# Patient Record
Sex: FEMALE | Race: WHITE | NOT HISPANIC OR LATINO | URBAN - METROPOLITAN AREA
[De-identification: names, ages, dates, MRNs, and addresses within clinical notes are randomized per-mention and may not be internally consistent; named-entity substitution may affect disease eponyms.]

---

## 2020-11-18 ENCOUNTER — TRANSFERRED RECORDS (OUTPATIENT)
Dept: HEALTH INFORMATION MANAGEMENT | Facility: CLINIC | Age: 60
End: 2020-11-18

## 2020-11-30 ENCOUNTER — TRANSFERRED RECORDS (OUTPATIENT)
Dept: HEALTH INFORMATION MANAGEMENT | Facility: CLINIC | Age: 60
End: 2020-11-30

## 2021-02-08 ENCOUNTER — TRANSFERRED RECORDS (OUTPATIENT)
Dept: HEALTH INFORMATION MANAGEMENT | Facility: CLINIC | Age: 61
End: 2021-02-08

## 2021-02-12 ENCOUNTER — TELEPHONE (OUTPATIENT)
Dept: GASTROENTEROLOGY | Facility: CLINIC | Age: 61
End: 2021-02-12

## 2021-03-04 ENCOUNTER — TRANSFERRED RECORDS (OUTPATIENT)
Dept: HEALTH INFORMATION MANAGEMENT | Facility: CLINIC | Age: 61
End: 2021-03-04

## 2021-03-05 ENCOUNTER — TRANSFERRED RECORDS (OUTPATIENT)
Dept: HEALTH INFORMATION MANAGEMENT | Facility: CLINIC | Age: 61
End: 2021-03-05

## 2021-03-05 ENCOUNTER — TELEPHONE (OUTPATIENT)
Dept: GASTROENTEROLOGY | Facility: CLINIC | Age: 61
End: 2021-03-05

## 2021-03-05 NOTE — TELEPHONE ENCOUNTER
[facility] Call Center    Phone Message    May a detailed message be left on voicemail: yes     Reason for Call: Other: Pt wishes to be seen in-person. Pt cannot do virtual call for pt lives in [place]. Please contact pt back to schedule. Thanks!     Action Taken: Message routed to:  [facility] hep    Travel Screening: Not Applicable

## 2021-03-08 ENCOUNTER — TELEPHONE (OUTPATIENT)
Dept: GASTROENTEROLOGY | Facility: CLINIC | Age: 61
End: 2021-03-08

## 2021-03-08 NOTE — TELEPHONE ENCOUNTER
RECORDS RECEIVED FROM: External   Appt Date: 03.09.2021   NOTES STATUS DETAILS   OFFICE NOTE from referring provider Received 02.12.2021 Stephanie Childs MD [facility]   OFFICE NOTES from other specialists N/A    DISCHARGE SUMMARY from hospital N/A    MEDICATION LIST Received 02.12.2021 Received [facility]   LIVER BIOSPY (IF APPLICABLE)      PATHOLOGY REPORTS  N/A    IMAGING     ENDOSCOPY (IF AVAILABLE) N/A    COLONOSCOPY (IF AVAILABLE) N/A    ULTRASOUND LIVER N/A    CT OF ABDOMEN Received 02.21.2021 CT ABDOMEN PELVIS [facility]   MRI OF LIVER N/A    FIBROSCAN, US ELASTOGRAPHY, FIBROSIS SCAN, MR ELASTOGRAPHY N/A    LABS     HEPATIC PANEL (LIVER PANEL) N/A    BASIC METABOLIC PANEL N/A    COMPLETE METABOLIC PANEL N/A    COMPLETE BLOOD COUNT (CBC) N/A    INTERNATIONAL NORMALIZED RATIO (INR) N/A    HEPATITIS C ANTIBODY N/A    HEPATITIS C VIRAL LOAD/PCR N/A    HEPATITIS C GENOTYPE N/A    HEPATITIS B SURFACE ANTIGEN Received 02.12.2021   HEPATITIS B SURFACE ANTIBODY Received 02.12.2021   HEPATITIS B DNA QUANT LEVEL N/A    HEPATITIS B CORE ANTIBODY Received 02.12.2021

## 2021-03-08 NOTE — TELEPHONE ENCOUNTER
[facility] Call Center    Phone Message    May a detailed message be left on voicemail: yes     Reason for Call: Patient has an in-person visit with Dr. Aiyana Pedersen on 3/9/21. Patient would like to change that appointment to video only.  Please reach out to patient.    Action Taken: Message routed to:  [facility] Hepatology    Travel Screening: Not Applicable

## 2021-03-09 ENCOUNTER — PRE VISIT (OUTPATIENT)
Dept: GASTROENTEROLOGY | Facility: CLINIC | Age: 61
End: 2021-03-09

## 2021-03-09 ENCOUNTER — VIRTUAL VISIT (OUTPATIENT)
Dept: GASTROENTEROLOGY | Facility: CLINIC | Age: 61
End: 2021-03-09
Attending: STUDENT IN AN ORGANIZED HEALTH CARE EDUCATION/TRAINING PROGRAM
Payer: COMMERCIAL

## 2021-03-09 DIAGNOSIS — F10.21 ALCOHOL USE DISORDER, SEVERE, IN EARLY REMISSION (H): ICD-10-CM

## 2021-03-09 DIAGNOSIS — R17 ELEVATED BILIRUBIN: ICD-10-CM

## 2021-03-09 DIAGNOSIS — K70.31 ALCOHOLIC CIRRHOSIS OF LIVER WITH ASCITES (H): Primary | ICD-10-CM

## 2021-03-09 PROCEDURE — 99205 OFFICE O/P NEW HI 60 MIN: CPT | Mod: 95 | Performed by: STUDENT IN AN ORGANIZED HEALTH CARE EDUCATION/TRAINING PROGRAM

## 2021-03-09 SDOH — HEALTH STABILITY: MENTAL HEALTH: HOW OFTEN DO YOU HAVE A DRINK CONTAINING ALCOHOL?: NOT ASKED

## 2021-03-09 SDOH — HEALTH STABILITY: MENTAL HEALTH: HOW MANY STANDARD DRINKS CONTAINING ALCOHOL DO YOU HAVE ON A TYPICAL DAY?: NOT ASKED

## 2021-03-09 SDOH — HEALTH STABILITY: MENTAL HEALTH: HOW OFTEN DO YOU HAVE 6 OR MORE DRINKS ON ONE OCCASION?: NOT ASKED

## 2021-03-09 ASSESSMENT — PAIN SCALES - GENERAL: PAINLEVEL: NO PAIN (0)

## 2021-03-09 NOTE — PROGRESS NOTES
"Jovana Delgado is being evaluated via a billable video visit.    The patient has been notified of following:   \"This video visit will be conducted via a call between you and your physician/provider. We have found that certain health care needs can be provided without the need for an in-person physical exam.  This service lets us provide the care you need with a video conversation.  If a prescription is necessary we can send it directly to your pharmacy.  If lab work is needed we can place an order for that and you can then stop by our lab to have the test done at a later time.  If during the course of the call the physician/provider feels a video visit is not appropriate, you will not be charged for this service.\"     Patient has given verbal consent for Video visit? Yes    Patient would like the video invitation sent by: Text to cell phone: see demographics    Video Start Time: 9:14   End Time 9:44    Mode of Communication:  Video Conference via [employer]  I have reviewed and updated the patient's Past Medical History, Social History, Family History and Medication List.    [facility] New Patient Visit    Date of Visit: March 9, 2021    Reason for referral: Decompensated cirrhosis    Subjective: Ms. Delgado is a 61 year old woman with a history of alcohol use, alcohol related cirrhosis c/b ascites, who presents for evaluation of her liver disease.     Presented 11/2020 to the ED with abdominal swelling. Had a CT scan that showed cirrhosis with ascites. Underwent 3.5 L paracentesis. Last drink 2 weeks prior to this. Labs 11/20/2020 significant for PT 16.6 BR 5.1 AST 89 ALT 22 Albumin 3.2 Hgb 13. Unsure if she was yellow at that time.     Was drinking about a bottle of wine a day. Stopped 11/15 because she felt something was wrong. Primary told her that her liver was fatty in the past and mildly elevated LFTs. Told it was fatty liver, not related to drinking. Has not done counseling or " treatment. Quit for a month in the past.     Has undergone several paracentesis in the meantime 12/29, 1/19, 2/2, 2/16, 3/2. Usually take 4-5 L, last only 2.1 L removed.    Never tried diuretics, denies issues with sodium or creatinine    Denies leg swelling    Not following low salt diet    Denies a history of GI bleeding, HE.     ROS: 14 point ROS negative except for positives noted in HPI.    PMHx:  Alcohol related cirrhosis  History of HTN - improved  No history of CAD, heart disease, cancer    PSHx:  Breast Augmentation   Bunionectomy  Colonoscopy   Tubal ligation  Hysterectomy    FamHx:  Father had a CVA/a. Fib  Mother with breast cancer  No family history of liver disease, liver cancer    SocHx:  Social History     Socioeconomic History     Marital status:      Spouse name: Not on file     Number of children: Not on file     Years of education: Not on file     Highest education level: Not on file   Occupational History     Not on file   Social Needs     Financial resource strain: Not on file     Food insecurity     Worry: Not on file     Inability: Not on file     Transportation needs     Medical: Not on file     Non-medical: Not on file   Tobacco Use     Smoking status: Not on file   Substance and Sexual Activity     Alcohol use: Not on file     Drug use: Not on file     Sexual activity: Not on file   Lifestyle     Physical activity     Days per week: Not on file     Minutes per session: Not on file     Stress: Not on file   Relationships     Social connections     Talks on phone: Not on file     Gets together: Not on file     Attends [employer] service: Not on file     Active member of club or organization: Not on file     Attends meetings of clubs or organizations: Not on file     Relationship status: Not on file     Intimate partner violence     Fear of current or ex partner: Not on file     Emotionally abused: Not on file     Physically abused: Not on file     Forced sexual activity: Not on file    Other Topics Concern     Not on file   Social History Narrative     Not on file   Never smoker  Denies alcohol use, last drink 11/15/2020, was drinking a bottle of wine day    Medications:  No current outpatient medications on file.     No current facility-administered medications for this visit.    None    Allergies:  Not on File    Objective:  There were no vitals taken for this visit.  Constitutional: pleasant woman in NAD  Eyes: non icteric  Respiratory: Normal respiratory excursion   MSK: normal range of motion of visualized extremities  Abd: Non distended  Skin: No jaundice  Psychiatric: normal mood and orientation    Labs:  Reviewed in HPI    11/2020  A1AT 214  Ceruloplasmin 18  Ferritin 724.5  Hepatitis C Ab negative  Hepatitis B Sag negative  Hepatitis B Core total Ab negative  Hepatitis A total ab negative    Patient tells me she had a 24 hour urine copper that was normal.     Imaging:    CT reportedly showing cirrhosis and ascites    Endoscopy:    Colonoscopy 3/2021 - reports having a small polyp  EGD 3/2021 - per patient no varices.     Independently reviewed labs and imaging.     Assessment/Plan: Ms. Delgado is a 61 year old woman with a history of alcohol use, alcohol related cirrhosis c/b ascites, who presents for evaluation of her liver disease.     We do not have updated labs, but the patient reports clinical improvement (improved volume removed during paracentesis). Discussed patients with alcohol related liver disease can slowly recompensate with sobriety, and this can take up to 6 months.     Discussed the natural history of alcohol related liver disease, risk of progression with return to drinking, risk of HCC, and role of transplant. Recommend medical management of her ascites currently, discussed TIPS and liver transplant if her ascites were to worsen.     - Request labs/imaging from [facility]. If creatinine/Na stable, will start lasix 20/spironolactone 50 mg daily  - Will also request  her endoscopy reports  - Recommend low salt diet, will also refer to dietician to discuss  - Would minimize paracentesis if possible  - SW/CD evaluation for alcohol use. Discussed need for complete sobriety from alcohol, and treatment would be required if she were to need a transplant in the future  - HCC screening: Imaging (CT 11/2020 - will request report) and AFP every 6 months    RTC 3 weeks.     Aiyana Pedersen MD MS  Hepatology/Liver Transplant  [facility]    This patient has been seen either via a virtual visit or an in person visit and maintain primary residence outside of the [facility].  They are being seen for a medical condition related to liver disease, and this consultation/visit was completed by a health care provider who is specialized in the assessment and treatment of liver disease and provides a unique medical service not available within most health care systems.

## 2021-03-09 NOTE — LETTER
"    3/9/2021         RE: Jovana Delgado  [address] [place] [address]        Dear Colleague,    Thank you for referring your patient, Jovana Delgado, to the [facility] [place]. Please see a copy of my visit note below.    Jovana Delgado is being evaluated via a billable video visit.    The patient has been notified of following:   \"This video visit will be conducted via a call between you and your physician/provider. We have found that certain health care needs can be provided without the need for an in-person physical exam.  This service lets us provide the care you need with a video conversation.  If a prescription is necessary we can send it directly to your pharmacy.  If lab work is needed we can place an order for that and you can then stop by our lab to have the test done at a later time.  If during the course of the call the physician/provider feels a video visit is not appropriate, you will not be charged for this service.\"     Patient has given verbal consent for Video visit? Yes    Patient would like the video invitation sent by: Text to cell phone: see demographics    Video Start Time: 9:14   End Time 9:44    Mode of Communication:  Video Conference via [employer]  I have reviewed and updated the patient's Past Medical History, Social History, Family History and Medication List.    [facility] New Patient Visit    Date of Visit: March 9, 2021    Reason for referral: Decompensated cirrhosis    Subjective: Ms. Delgado is a 61 year old woman with a history of alcohol use, alcohol related cirrhosis c/b ascites, who presents for evaluation of her liver disease.     Presented 11/2020 to the ED with abdominal swelling. Had a CT scan that showed cirrhosis with ascites. Underwent 3.5 L paracentesis. Last drink 2 weeks prior to this. Labs 11/20/2020 significant for PT 16.6 BR 5.1 AST 89 ALT 22 Albumin 3.2 Hgb 13. Unsure if she was yellow at that time. "     Was drinking about a bottle of wine a day. Stopped 11/15 because she felt something was wrong. Primary told her that her liver was fatty in the past and mildly elevated LFTs. Told it was fatty liver, not related to drinking. Has not done counseling or treatment. Quit for a month in the past.     Has undergone several paracentesis in the meantime 12/29, 1/19, 2/2, 2/16, 3/2. Usually take 4-5 L, last only 2.1 L removed.    Never tried diuretics, denies issues with sodium or creatinine    Denies leg swelling    Not following low salt diet    Denies a history of GI bleeding, HE.     ROS: 14 point ROS negative except for positives noted in HPI.    PMHx:  Alcohol related cirrhosis  History of HTN - improved  No history of CAD, heart disease, cancer    PSHx:  Breast Augmentation   Bunionectomy  Colonoscopy   Tubal ligation  Hysterectomy    FamHx:  Father had a CVA/a. Fib  Mother with breast cancer  No family history of liver disease, liver cancer    SocHx:  Social History     Socioeconomic History     Marital status:      Spouse name: Not on file     Number of children: Not on file     Years of education: Not on file     Highest education level: Not on file   Occupational History     Not on file   Social Needs     Financial resource strain: Not on file     Food insecurity     Worry: Not on file     Inability: Not on file     Transportation needs     Medical: Not on file     Non-medical: Not on file   Tobacco Use     Smoking status: Not on file   Substance and Sexual Activity     Alcohol use: Not on file     Drug use: Not on file     Sexual activity: Not on file   Lifestyle     Physical activity     Days per week: Not on file     Minutes per session: Not on file     Stress: Not on file   Relationships     Social connections     Talks on phone: Not on file     Gets together: Not on file     Attends Rastafari service: Not on file     Active member of club or organization: Not on file     Attends meetings of clubs  or organizations: Not on file     Relationship status: Not on file     Intimate partner violence     Fear of current or ex partner: Not on file     Emotionally abused: Not on file     Physically abused: Not on file     Forced sexual activity: Not on file   Other Topics Concern     Not on file   Social History Narrative     Not on file   Never smoker  Denies alcohol use, last drink 11/15/2020, was drinking a bottle of wine day    Medications:  No current outpatient medications on file.     No current facility-administered medications for this visit.    None    Allergies:  Not on File    Objective:  There were no vitals taken for this visit.  Constitutional: pleasant woman in NAD  Eyes: non icteric  Respiratory: Normal respiratory excursion   MSK: normal range of motion of visualized extremities  Abd: Non distended  Skin: No jaundice  Psychiatric: normal mood and orientation    Labs:  Reviewed in [facility]    11/2020  A1AT 214  Ceruloplasmin 18  Ferritin 724.5  Hepatitis C Ab negative  Hepatitis B Sag negative  Hepatitis B Core total Ab negative  Hepatitis A total ab negative    Patient tells me she had a 24 hour urine copper that was normal.     Imaging:    CT reportedly showing cirrhosis and ascites    Endoscopy:    Colonoscopy 3/2021 - reports having a small polyp  EGD 3/2021 - per patient no varices.     Independently reviewed labs and imaging.     Assessment/Plan: Ms. Delgado is a 61 year old woman with a history of alcohol use, alcohol related cirrhosis c/b ascites, who presents for evaluation of her liver disease.     We do not have updated labs, but the patient reports clinical improvement (improved volume removed during paracentesis). Discussed patients with alcohol related liver disease can slowly recompensate with sobriety, and this can take up to 6 months.     Discussed the natural history of alcohol related liver disease, risk of progression with return to drinking, risk of HCC, and role of transplant.  Recommend medical management of her ascites currently, discussed TIPS and liver transplant if her ascites were to worsen.     - Request labs/imaging from [facility]. If creatinine/Na stable, will start lasix 20/spironolactone 50 mg daily  - Will also request her endoscopy reports  - Recommend low salt diet, will also refer to dietician to discuss  - Would minimize paracentesis if possible  - SW/CD evaluation for alcohol use. Discussed need for complete sobriety from alcohol, and treatment would be required if she were to need a transplant in the future  - HCC screening: Imaging (CT 11/2020 - will request report) and AFP every 6 months    RTC 3 weeks.     Aiyana Pedersen MD MS  Hepatology/Liver Transplant  [facility]    This patient has been seen either via a virtual visit or an in person visit and maintain primary residence outside of the [facility].  They are being seen for a medical condition related to liver disease, and this consultation/visit was completed by a health care provider who is specialized in the assessment and treatment of liver disease and provides a unique medical service not available within most health care systems.        Again, thank you for allowing me to participate in the care of your patient.        Sincerely,        Aiyana Pedersen MD

## 2021-03-09 NOTE — PROGRESS NOTES
Jovana is a 61 year old who is being evaluated via a billable telephone visit.      What phone number would you like to be contacted at? [phone]  How would you like to obtain your AVS? Mail a copy  Phone call duration: *** minutes

## 2021-03-09 NOTE — LETTER
Date:March 11, 2021      Patient was self referred, no letter generated. Do not send.        [facility] Health Information

## 2021-03-10 ENCOUNTER — TELEPHONE (OUTPATIENT)
Dept: GASTROENTEROLOGY | Facility: CLINIC | Age: 61
End: 2021-03-10

## 2021-03-10 NOTE — TELEPHONE ENCOUNTER
Connected with patient that per Dr. Pedersen patient should have her PCP refer her to a dietician in [place]. Pt voiced understanding and is agreeable to plan.    Suri BO LPN  Hepatology Clinic

## 2021-03-10 NOTE — TELEPHONE ENCOUNTER
Pt would like to know what other options she has for a Dietician as she lives in [place] and cannot do a virtual visit with [facility] providers.  Please call pt to advise.      Pillo Newman   [place]   Diabetes & Nutrition Scheduling  [phone]

## 2021-03-15 DIAGNOSIS — K70.31 ALCOHOLIC CIRRHOSIS OF LIVER WITH ASCITES (H): Primary | ICD-10-CM

## 2021-03-15 NOTE — TELEPHONE ENCOUNTER
Lab orders faxed to [facility]. [place] lab staff will reach out to patient once labs are received to schedule.    Suri BO LPN  Hepatology Clinic      ----- Message from Aiyana Pedersen MD sent at 3/15/2021 12:42 PM CDT -----  CMP, CBC, INR, thanks!  ----- Message -----  From: Suri Calderon LPN  Sent: 3/15/2021  12:25 PM CDT  To: Aiyana Pedersen MD    Which labs did you want ordered?  ----- Message -----  From: George Ponce  Sent: 3/12/2021   2:29 PM CDT  To: Suri Calderon LPN    Hi Suri, are you able to fax over lab orders to [facility] in [place], [place]? Dr. Pedersen is going to order new labs from them because they didn't get the previous ones sent over to us yet. Their fax is [phone]. Thanks!

## 2021-03-17 ENCOUNTER — TRANSFERRED RECORDS (OUTPATIENT)
Dept: HEALTH INFORMATION MANAGEMENT | Facility: CLINIC | Age: 61
End: 2021-03-17

## 2021-03-17 LAB
ALT SERPL-CCNC: 15 (ref 7–52)
AST SERPL-CCNC: 32 (ref 13–39)
CREAT SERPL-MCNC: 0.49 MG/DL (ref 0.6–1.2)
GFR SERPL CREATININE-BSD FRML MDRD: >90 ML/MIN/1.73M2
GLUCOSE SERPL-MCNC: 76 MG/DL (ref 70–105)
INR PPP: 1.3 (ref 0.8–1.1)
POTASSIUM SERPL-SCNC: 3.8 MEQ/L (ref 3.5–5.1)

## 2021-03-18 ENCOUNTER — TELEPHONE (OUTPATIENT)
Dept: GASTROENTEROLOGY | Facility: CLINIC | Age: 61
End: 2021-03-18

## 2021-03-18 ENCOUNTER — TEAM CONFERENCE (OUTPATIENT)
Dept: GASTROENTEROLOGY | Facility: CLINIC | Age: 61
End: 2021-03-18

## 2021-03-18 NOTE — LETTER
May 6, 2021       TO: Jovana Delgado  [address] [place] [address]       Dear Ms. Delgado,    We are writing to inform you of your test results.    Your labs are stable, and US looks as expected - has evidence of your known liver disease, no findings concerning for a mass.          Aiyana Pedersen MD  [address] [facility] [address] [address]

## 2021-03-18 NOTE — CONFIDENTIAL NOTE
Labs from [facility] showing normal creatinine and sodium    MELD Na 17      Suri - can you have her start lasix 20 and spironolactone 50 mg daily and get repeat BMP in 1 week?

## 2021-03-19 NOTE — TELEPHONE ENCOUNTER
Call back to patient letting her know Dr. Pedersen is fine with pt not taken diuretics at this time, but should update clinic with any changes in fluid retention. Pt is agreeable to plan.    Suri BO LPN  Hepatology Clinic    ------  Suri Calderon LPN -> Aiyana Pedersen MD    I just talked with the patient she wanted you to know she has not needed a para in two weeks and the last one they only took off 2.1 liters, she doesn't feel like she is retaining any fluid . Pt is questioning if she needs the diuretics or can she hold off for now and update clinic if she needs them?

## 2021-03-19 NOTE — TELEPHONE ENCOUNTER
Yea that is fine - she should just let us know if she needs another para - we should start diuretics after that. She has been getting them locally, and has orders for them I think

## 2021-04-02 ENCOUNTER — VIRTUAL VISIT (OUTPATIENT)
Dept: GASTROENTEROLOGY | Facility: CLINIC | Age: 61
End: 2021-04-02
Attending: STUDENT IN AN ORGANIZED HEALTH CARE EDUCATION/TRAINING PROGRAM
Payer: COMMERCIAL

## 2021-04-02 DIAGNOSIS — K70.31 ALCOHOLIC CIRRHOSIS OF LIVER WITH ASCITES (H): Primary | ICD-10-CM

## 2021-04-02 DIAGNOSIS — F10.21 ALCOHOL USE DISORDER, SEVERE, IN EARLY REMISSION (H): ICD-10-CM

## 2021-04-02 PROCEDURE — 99215 OFFICE O/P EST HI 40 MIN: CPT | Mod: 95 | Performed by: STUDENT IN AN ORGANIZED HEALTH CARE EDUCATION/TRAINING PROGRAM

## 2021-04-02 ASSESSMENT — PAIN SCALES - GENERAL: PAINLEVEL: NO PAIN (0)

## 2021-04-02 NOTE — PROGRESS NOTES
Jovana is a 61 year old who is being evaluated via a billable video visit.    ** Patient can do [employer] **  How would you like to obtain your AVS? MyChart  If the video visit is dropped, the invitation should be resent by: Text to cell phone: [phone]  Will anyone else be joining your video visit? No      Video Start Time: 10:30 AM  Video-Visit Details    Type of service:  Video Visit    Video End Time:11:00    Originating Location (pt. Location): Home    Distant Location (provider location):  [facility] [place]     [facility] for [facility] Visit[facility]     [place]

## 2021-04-02 NOTE — LETTER
Date:April 5, 2021      Patient was self referred, no letter generated. Do not send.        [facility] Health Information

## 2021-04-02 NOTE — Clinical Note
Suri - can you send her lab orders and RUQ US order to [facility]?    George - can you schedule RV in person in August?

## 2021-04-02 NOTE — LETTER
4/2/2021         RE: Jovana Delgado  [address] [facility] [address]        Dear Colleague,    Thank you for referring your patient, Jovana Delgado, to the [facility]PATOLOG[facility] [place]. Please see a copy of my visit note below.    [facility] Clinic New Patient Visit    Date of Visit: April 2nd, 2021    Reason for referral: Decompensated cirrhosis    Subjective: Ms. Delgado is a 61 year old woman with a history of alcohol use, alcohol related cirrhosis c/b ascites, who presents for evaluation of her liver disease.     Presented 11/2020 to the ED with abdominal swelling. Had a CT scan that showed cirrhosis with ascites. Underwent 3.5 L paracentesis. Last drink 2 weeks prior to this. Labs 11/20/2020 significant for PT 16.6 BR 5.1 AST 89 ALT 22 Albumin 3.2 Hgb 13. Unsure if she was yellow at that time.     Was drinking about a bottle of wine a day. Stopped 11/15 because she felt something was wrong. Primary told her that her liver was fatty in the past and mildly elevated LFTs. Told it was fatty liver, not related to drinking. Has not done counseling or treatment. Quit for a month in the past.     Has undergone several paracentesis in the meantime 12/29, 1/19, 2/2, 2/16, 3/2. Usually take 4-5 L, last only 2.1 L removed.    Denies a history of GI bleeding, HE.     Interval Events:    - Labs 3/18 MELD Na 17 - plt 101, INR 1.3, creatinine 0.49, BR 2.3  - Has not had a paracentesis for 4 weeks (only 2.1 L removed), LE swelling much better as well.   - Losing hair on her head   - Not drinking alcohol    ROS: 14 point ROS negative except for positives noted in HPI.    PMHx:  Alcohol related cirrhosis  History of HTN - improved  No history of CAD, heart disease, cancer    PSHx:  Breast Augmentation   Bunionectomy  Colonoscopy   Tubal ligation  Hysterectomy    FamHx:  Father had a CVA/a. Fib  Mother with breast cancer  No family history of liver disease, liver cancer    SocHx:  Social  History     Socioeconomic History     Marital status:      Spouse name: Not on file     Number of children: Not on file     Years of education: Not on file     Highest education level: Not on file   Occupational History     Not on file   Social Needs     Financial resource strain: Not on file     Food insecurity     Worry: Not on file     Inability: Not on file     Transportation needs     Medical: Not on file     Non-medical: Not on file   Tobacco Use     Smoking status: Never Smoker     Smokeless tobacco: Never Used   Substance and Sexual Activity     Alcohol use: Not Currently     Comment: did not use 4 month     Drug use: Never     Sexual activity: Not on file   Lifestyle     Physical activity     Days per week: Not on file     Minutes per session: Not on file     Stress: Not on file   Relationships     Social connections     Talks on phone: Not on file     Gets together: Not on file     Attends Denominational service: Not on file     Active member of club or organization: Not on file     Attends meetings of clubs or organizations: Not on file     Relationship status: Not on file     Intimate partner violence     Fear of current or ex partner: Not on file     Emotionally abused: Not on file     Physically abused: Not on file     Forced sexual activity: Not on file   Other Topics Concern     Not on file   Social History Narrative     Not on file   Never smoker  Denies alcohol use, last drink 11/15/2020, was drinking a bottle of wine day    Medications:  No current outpatient medications on file.     No current facility-administered medications for this visit.    None    Allergies:  No Known Allergies    Objective:  There were no vitals taken for this visit.  Constitutional: pleasant woman in NAD  Eyes: non icteric  Respiratory: Normal respiratory excursion   MSK: normal range of motion of visualized extremities  Abd: Non distended  Skin: No jaundice  Psychiatric: normal mood and orientation    Labs:  Reviewed  in HPI    11/2020  A1AT 214  Ceruloplasmin 18  Ferritin 724.5  Hepatitis C Ab negative  Hepatitis B Sag negative  Hepatitis B Core total Ab negative  Hepatitis A total ab negative    Patient tells me she had a 24 hour urine copper that was normal.     Imaging:    CT AP with contrast 11/2020:   - Hepatosplenomegaly with evidence of early nodular cirrhosis, ascites presents  - No liver mass  - Diffuse bowel edema secondary to ascites  - Diverticulosis    Endoscopy:    Colonoscopy 3/2021 -diverticulosis, 5 mm adenoma  EGD 3/2021 - PHG, no varices    Independently reviewed labs and imaging.     Assessment/Plan: Ms. Delgado is a 61 year old woman with a history of alcohol use, alcohol related cirrhosis c/b ascites, who presents for evaluation of her liver disease.     We do not have updated labs, but the patient reports clinical improvement (improved volume removed during paracentesis). Discussed patients with alcohol related liver disease can slowly recompensate with sobriety, and this can take up to 6 months. Last drink 11/2020.     Discussed the natural history of alcohol related liver disease, risk of progression with return to drinking, risk of HCC, and role of transplant.     In the interim, her ascites has improved and she has not required another paracentesis. Not having issues with LE edema.     Most recent MELD Na 17.    - Monitor for signs of recurrent ascites/LE edema, can start diuretics if needed  - Discussed need for complete sobriety from alcohol, and treatment would be required if she were to need a transplant in the future  - HCC screening: Imaging (CT 11/2020 - will request report) and AFP every 6 months    RTC August 2020    Labs (AFP, CBC, CMP, INR) and RUQ US < 1 month    Aiyana Pedersen MD MS  Hepatology/Liver Transplant  [facility]    This patient has been seen either via a virtual visit or an in person visit and maintain primary residence outside of the [facility].  They are  being seen for a medical condition related to liver disease, and this consultation/visit was completed by a health care provider who is specialized in the assessment and treatment of liver disease and provides a unique medical service not available within most health care systems.    Jovana is a 61 year old who is being evaluated via a billable video visit.    ** Patient can do Doximity **  How would you like to obtain your AVS? MyChart  If the video visit is dropped, the invitation should be resent by: Text to cell phone: [phone]  Will anyone else be joining your video visit? No      Video Start Time: 10:30 AM  Video-Visit Details    Type of service:  Video Visit    Video End Time:11:00    Originating Location (pt. Location): Home    Distant Location (provider location):  [facility]PATOLOG[facility] [place]     Platform used for Video Visit: Doximity      Lab orders and imaging faxed to [facility] in [place], [place]. Asked that Sheila update pt when they receive orders.    Suri BO LPN  Hepatology Clinic      Again, thank you for allowing me to participate in the care of your patient.        Sincerely,        Aiyana Pedersen MD

## 2021-04-02 NOTE — PROGRESS NOTES
[facility] Liver Clinic New Patient Visit    Date of Visit: April 2nd, 2021    Reason for referral: Decompensated cirrhosis    Subjective: Ms. Delgado is a 61 year old woman with a history of alcohol use, alcohol related cirrhosis c/b ascites, who presents for evaluation of her liver disease.     Presented 11/2020 to the ED with abdominal swelling. Had a CT scan that showed cirrhosis with ascites. Underwent 3.5 L paracentesis. Last drink 2 weeks prior to this. Labs 11/20/2020 significant for PT 16.6 BR 5.1 AST 89 ALT 22 Albumin 3.2 Hgb 13. Unsure if she was yellow at that time.     Was drinking about a bottle of wine a day. Stopped 11/15 because she felt something was wrong. Primary told her that her liver was fatty in the past and mildly elevated LFTs. Told it was fatty liver, not related to drinking. Has not done counseling or treatment. Quit for a month in the past.     Has undergone several paracentesis in the meantime 12/29, 1/19, 2/2, 2/16, 3/2. Usually take 4-5 L, last only 2.1 L removed.    Denies a history of GI bleeding, HE.     Interval Events:    - Labs 3/18 MELD Na 17 - plt 101, INR 1.3, creatinine 0.49, BR 2.3  - Has not had a paracentesis for 4 weeks (only 2.1 L removed), LE swelling much better as well.   - Losing hair on her head   - Not drinking alcohol    ROS: 14 point ROS negative except for positives noted in HPI.    PMHx:  Alcohol related cirrhosis  History of HTN - improved  No history of CAD, heart disease, cancer    PSHx:  Breast Augmentation   Bunionectomy  Colonoscopy   Tubal ligation  Hysterectomy    FamHx:  Father had a CVA/a. Fib  Mother with breast cancer  No family history of liver disease, liver cancer    SocHx:  Social History     Socioeconomic History     Marital status:      Spouse name: Not on file     Number of children: Not on file     Years of education: Not on file     Highest education level: Not on file   Occupational History     Not on file   Social  Needs     Financial resource strain: Not on file     Food insecurity     Worry: Not on file     Inability: Not on file     Transportation needs     Medical: Not on file     Non-medical: Not on file   Tobacco Use     Smoking status: Never Smoker     Smokeless tobacco: Never Used   Substance and Sexual Activity     Alcohol use: Not Currently     Comment: did not use 4 month     Drug use: Never     Sexual activity: Not on file   Lifestyle     Physical activity     Days per week: Not on file     Minutes per session: Not on file     Stress: Not on file   Relationships     Social connections     Talks on phone: Not on file     Gets together: Not on file     Attends [employer] service: Not on file     Active member of club or organization: Not on file     Attends meetings of clubs or organizations: Not on file     Relationship status: Not on file     Intimate partner violence     Fear of current or ex partner: Not on file     Emotionally abused: Not on file     Physically abused: Not on file     Forced sexual activity: Not on file   Other Topics Concern     Not on file   Social History Narrative     Not on file   Never smoker  Denies alcohol use, last drink 11/15/2020, was drinking a bottle of wine day    Medications:  No current outpatient medications on file.     No current facility-administered medications for this visit.    None    Allergies:  No Known Allergies    Objective:  There were no vitals taken for this visit.  Constitutional: pleasant woman in NAD  Eyes: non icteric  Respiratory: Normal respiratory excursion   MSK: normal range of motion of visualized extremities  Abd: Non distended  Skin: No jaundice  Psychiatric: normal mood and orientation    Labs:  Reviewed in [facility]    11/2020  A1AT 214  Ceruloplasmin 18  Ferritin 724.5  Hepatitis C Ab negative  Hepatitis B Sag negative  Hepatitis B Core total Ab negative  Hepatitis A total ab negative    Patient tells me she had a 24 hour urine copper that was normal.      Imaging:    CT AP with contrast 11/2020:   - Hepatosplenomegaly with evidence of early nodular cirrhosis, ascites presents  - No liver mass  - Diffuse bowel edema secondary to ascites  - Diverticulosis    Endoscopy:    Colonoscopy 3/2021 -diverticulosis, 5 mm adenoma  EGD 3/2021 - PHG, no varices    Independently reviewed labs and imaging.     Assessment/Plan: Ms. Delgado is a 61 year old woman with a history of alcohol use, alcohol related cirrhosis c/b ascites, who presents for evaluation of her liver disease.     We do not have updated labs, but the patient reports clinical improvement (improved volume removed during paracentesis). Discussed patients with alcohol related liver disease can slowly recompensate with sobriety, and this can take up to 6 months. Last drink 11/2020.     Discussed the natural history of alcohol related liver disease, risk of progression with return to drinking, risk of HCC, and role of transplant.     In the interim, her ascites has improved and she has not required another paracentesis. Not having issues with LE edema.     Most recent MELD Na 17.    - Monitor for signs of recurrent ascites/LE edema, can start diuretics if needed  - Discussed need for complete sobriety from alcohol, and treatment would be required if she were to need a transplant in the future  - HCC screening: Imaging (CT 11/2020 - will request report) and AFP every 6 months    RTC August 2020    Labs (AFP, CBC, CMP, INR) and RUQ US < 1 month    Aiyana Pedersen MD MS  Hepatology/Liver Transplant  [facility]    This patient has been seen either via a virtual visit or an in person visit and maintain primary residence outside of the [facility].  They are being seen for a medical condition related to liver disease, and this consultation/visit was completed by a health care provider who is specialized in the assessment and treatment of liver disease and provides a unique medical service not available  within most health care systems.

## 2021-04-05 NOTE — PROGRESS NOTES
Lab orders and imaging faxed to [facility] in [place], [place]. Asked that [facility] update pt when they receive orders.    Suri BO LPN  Hepatology Clinic

## 2021-04-08 ENCOUNTER — TRANSFERRED RECORDS (OUTPATIENT)
Dept: HEALTH INFORMATION MANAGEMENT | Facility: CLINIC | Age: 61
End: 2021-04-08

## 2021-04-08 LAB
ALT SERPL-CCNC: 14 INTERNATIONAL_UNITS/L (ref 7–52)
AST SERPL-CCNC: 29 INTERNATIONAL_UNITS/L (ref 13–39)
CREAT SERPL-MCNC: 0.57 MG/DL (ref 0.6–1.2)
GFR SERPL CREATININE-BSD FRML MDRD: >90 ML/MIN/1.73M2
GLUCOSE SERPL-MCNC: 134 MG/DL (ref 70–105)
INR PPP: 1.3 (ref 0.8–1.1)
POTASSIUM SERPL-SCNC: 3.7 MMOL/L (ref 3.5–5.1)

## 2021-04-19 ENCOUNTER — TRANSFERRED RECORDS (OUTPATIENT)
Dept: HEALTH INFORMATION MANAGEMENT | Facility: CLINIC | Age: 61
End: 2021-04-19

## 2021-05-03 NOTE — TELEPHONE ENCOUNTER
[facility]    Phone Message    May a detailed message be left on voicemail: yes     Reason for Call: Requesting Results   Name/type of test: US   Date of test: 04/19/20  Was test done at a location other than [facility] (Please fill in the location if not [facility])?: Yes: [facility]- reports have be uploaded in patients chart. Please call to discuss and advise.      Action Taken: Message routed to:  [facility]    Travel Screening: Not Applicable

## 2021-05-06 NOTE — TELEPHONE ENCOUNTER
Can you send her a letter stating her labs are stable, and US looks as excited - has evidence of her known liver disease, no findings concerning for a mass

## 2021-08-19 ENCOUNTER — TELEPHONE (OUTPATIENT)
Dept: GASTROENTEROLOGY | Facility: CLINIC | Age: 61
End: 2021-08-19

## 2021-08-19 DIAGNOSIS — K70.31 ALCOHOLIC CIRRHOSIS OF LIVER WITH ASCITES (H): Primary | ICD-10-CM

## 2021-08-19 NOTE — TELEPHONE ENCOUNTER
[facility] Call Center    Phone Message    May a detailed message be left on voicemail: yes     Reason for Call: Order(s): Other:   Reason for requested: lab orders to be sent to [address]- [phone]  Date needed: ASAP  Provider name: Dr. Pedersen    Patient is also wondering if an ultra sound is needed.  Please reach out to patient once the lab orders are sent and in regards to ultra sound.      Action Taken: Message routed to:  [facility] Hepatology    Travel Screening: Not Applicable

## 2021-08-20 NOTE — TELEPHONE ENCOUNTER
She does not need updated US - that is due 10/2021, she just needs labs prior to her visit (CBC, CMP, INR, AFP)

## 2021-08-25 ENCOUNTER — OFFICE VISIT (OUTPATIENT)
Dept: GASTROENTEROLOGY | Facility: CLINIC | Age: 61
End: 2021-08-25
Attending: STUDENT IN AN ORGANIZED HEALTH CARE EDUCATION/TRAINING PROGRAM
Payer: COMMERCIAL

## 2021-08-25 VITALS
TEMPERATURE: 98 F | SYSTOLIC BLOOD PRESSURE: 124 MMHG | RESPIRATION RATE: 18 BRPM | HEIGHT: 66 IN | WEIGHT: 140.8 LBS | DIASTOLIC BLOOD PRESSURE: 71 MMHG | OXYGEN SATURATION: 97 % | HEART RATE: 81 BPM | BODY MASS INDEX: 22.63 KG/M2

## 2021-08-25 DIAGNOSIS — F10.21 ALCOHOL USE DISORDER, SEVERE, IN EARLY REMISSION (H): ICD-10-CM

## 2021-08-25 DIAGNOSIS — R79.89 ELEVATED FERRITIN: ICD-10-CM

## 2021-08-25 DIAGNOSIS — K70.31 ALCOHOLIC CIRRHOSIS OF LIVER WITH ASCITES (H): Primary | ICD-10-CM

## 2021-08-25 PROCEDURE — 99214 OFFICE O/P EST MOD 30 MIN: CPT | Performed by: STUDENT IN AN ORGANIZED HEALTH CARE EDUCATION/TRAINING PROGRAM

## 2021-08-25 RX ORDER — IBUPROFEN 200 MG
200 TABLET ORAL EVERY 4 HOURS PRN
COMMUNITY

## 2021-08-25 ASSESSMENT — PAIN SCALES - GENERAL: PAINLEVEL: MILD PAIN (3)

## 2021-08-25 ASSESSMENT — MIFFLIN-ST. JEOR: SCORE: 1222.47

## 2021-08-25 NOTE — LETTER
8/25/2021         RE: Jovana Delgado  [address] [facility] [address]        Dear Colleague,    Thank you for referring your patient, Jovana Delgado, to the [facility]PATOLOGY [facility]IN[facility] [place]. Please see a copy of my visit note below.    [facility] Return Patient Visit    Date of Visit: August 25, 2021    Reason for referral: Decompensated cirrhosis    Subjective: Ms. Delgado is a 61 year old woman with a history of alcohol use, alcohol related cirrhosis c/b ascites, who presents for evaluation of her liver disease.     Initial History:     Presented 11/2020 to the ED with abdominal swelling. Had a CT scan that showed cirrhosis with ascites. Underwent 3.5 L paracentesis. Last drink 2 weeks prior to this. Labs 11/20/2020 significant for PT 16.6 BR 5.1 AST 89 ALT 22 Albumin 3.2 Hgb 13. Unsure if she was yellow at that time.     Was drinking about a bottle of wine a day. Stopped 11/15 because she felt something was wrong. Primary told her that her liver was fatty in the past and mildly elevated LFTs. Told it was fatty liver, not related to drinking. Has not done counseling or treatment. Quit for a month in the past.     Has undergone several paracentesis in the meantime 12/29, 1/19, 2/2, 2/16, 3/2. Usually take 4-5 L, last only 2.1 L removed.    Never tried diuretics, denies issues with sodium or creatinine    Denies a history of GI bleeding, HE.     Interval Events:  - Doing well  - Sober from alcohol since last fall  - Having pain in her ankles, knees, elbows  - Having issues with right arm numbness at night  - No s/s of liver decompensation    ROS: 14 point ROS negative except for positives noted in HPI.    PMHx:  Alcohol related cirrhosis  History of HTN - improved  No history of CAD, heart disease, cancer    PSHx:  Breast Augmentation   Bunionectomy  Colonoscopy   Tubal ligation  Hysterectomy > 20 years ago    FamHx:  Father had a CVA/a. Fib  Mother with breast cancer  No  family history of liver disease, liver cancer    SocHx:  Social History     Socioeconomic History     Marital status:      Spouse name: Not on file     Number of children: Not on file     Years of education: Not on file     Highest education level: Not on file   Occupational History     Not on file   Tobacco Use     Smoking status: Never Smoker     Smokeless tobacco: Never Used   Substance and Sexual Activity     Alcohol use: Not Currently     Comment: Last drink 11/16/2020     Drug use: Never     Sexual activity: Not on file   Other Topics Concern     Not on file   Social History Narrative     Not on file     Social Determinants of Health     Financial Resource Strain:      Difficulty of Paying Living Expenses:    Food Insecurity:      Worried About Running Out of Food in the Last Year:      Ran Out of Food in the Last Year:    Transportation Needs:      Lack of Transportation (Medical):      Lack of Transportation (Non-Medical):    Physical Activity:      Days of Exercise per Week:      Minutes of Exercise per Session:    Stress:      Feeling of Stress :    Social Connections:      Frequency of Communication with Friends and Family:      Frequency of Social Gatherings with Friends and Family:      Attends Rastafari Services:      Active Member of Clubs or Organizations:      Attends Club or Organization Meetings:      Marital Status:    Intimate Partner Violence:      Fear of Current or Ex-Partner:      Emotionally Abused:      Physically Abused:      Sexually Abused:    Never smoker  Denies alcohol use, last drink 11/15/2020, was drinking a bottle of wine day    Medications:  Current Outpatient Medications   Medication     cholecalciferol (VITAMIN D3) 25 mcg (1000 units) capsule     ibuprofen (MOTRIN IB) 200 MG tablet     No current facility-administered medications for this visit.     Allergies:  No Known Allergies    Objective:  /71 (BP Location: Right arm, Patient Position: Sitting, Cuff Size:  "Adult Regular)   Pulse 81   Temp 98  F (36.7  C) (Oral)   Resp 18   Ht 1.664 m (5' 5.5\")   Wt 63.9 kg (140 lb 12.8 oz)   SpO2 97%   BMI 23.07 kg/m    Constitutional: pleasant woman in NAD  Eyes: non icteric  Respiratory: Normal respiratory excursion   MSK: normal range of motion of visualized extremities  Abd: Non distended  Skin: No jaundice  Psychiatric: normal mood and orientation    Labs:    8/2021    AFP 5.8  Alk phos 164  TBR 1.3  DBR 0.6  INR 1.2  Na 139  Creatinine 0.66  Platelets 75    MELD Na 10    11/2020  A1AT 214  Ceruloplasmin 18  Ferritin 724.5  Hepatitis C Ab negative  Hepatitis B Sag negative  Hepatitis B Core total Ab negative  Hepatitis A total ab negative    Patient tells me she had a 24 hour urine copper that was normal.     Imaging:    CT reportedly showing cirrhosis and ascites    RUQ US 4/2021 - fatty liver without masses, small volume ascites    Endoscopy:    Colonoscopy 3/2021 - reports having a small polyp  EGD 3/2021 - per patient no varices.     Independently reviewed labs and imaging.     Assessment/Plan: Ms. Delgado is a 61 year old woman with a history of alcohol use, alcohol related cirrhosis c/b ascites, who presents for follow up of her liver disease.    She appears to have recompensated with prolonged period of sobriety    Discussed the natural history of alcohol related liver disease, risk of progression with return to drinking, risk of HCC, and role of transplant.    She is having some joint pains, will recheck her ferritin. Was likely elevated in the past related to alcohol use    - Will see if we can add on her ferritin to her labs  - HCC Screening: Due for repeat RUQ US 10/2021, AFP 2/2022  - EV screening: EGd 3/2021 without varices, repeat 2-3 years  - Recommend neurology referral for her neuropathy    RTC 6 months    Aiyana Pedersen MD MS  Hepatology/Liver Transplant  [facility]        Again, thank you for allowing me to participate in the care of your " patient.        Sincerely,        Aiyana Pedersen MD

## 2021-08-25 NOTE — PROGRESS NOTES
[facility] Return Patient Visit    Date of Visit: August 25, 2021    Reason for referral: Decompensated cirrhosis    Subjective: Ms. Delgado is a 61 year old woman with a history of alcohol use, alcohol related cirrhosis c/b ascites, who presents for evaluation of her liver disease.     Initial History:     Presented 11/2020 to the ED with abdominal swelling. Had a CT scan that showed cirrhosis with ascites. Underwent 3.5 L paracentesis. Last drink 2 weeks prior to this. Labs 11/20/2020 significant for PT 16.6 BR 5.1 AST 89 ALT 22 Albumin 3.2 Hgb 13. Unsure if she was yellow at that time.     Was drinking about a bottle of wine a day. Stopped 11/15 because she felt something was wrong. Primary told her that her liver was fatty in the past and mildly elevated LFTs. Told it was fatty liver, not related to drinking. Has not done counseling or treatment. Quit for a month in the past.     Has undergone several paracentesis in the meantime 12/29, 1/19, 2/2, 2/16, 3/2. Usually take 4-5 L, last only 2.1 L removed.    Never tried diuretics, denies issues with sodium or creatinine    Denies a history of GI bleeding, HE.     Interval Events:  - Doing well  - Sober from alcohol since last fall  - Having pain in her ankles, knees, elbows  - Having issues with right arm numbness at night  - No s/s of liver decompensation    ROS: 14 point ROS negative except for positives noted in HPI.    PMHx:  Alcohol related cirrhosis  History of HTN - improved  No history of CAD, heart disease, cancer    PSHx:  Breast Augmentation   Bunionectomy  Colonoscopy   Tubal ligation  Hysterectomy > 20 years ago    FamHx:  Father had a CVA/a. Fib  Mother with breast cancer  No family history of liver disease, liver cancer    SocHx:  Social History     Socioeconomic History     Marital status:      Spouse name: Not on file     Number of children: Not on file     Years of education: Not on file     Highest education  "level: Not on file   Occupational History     Not on file   Tobacco Use     Smoking status: Never Smoker     Smokeless tobacco: Never Used   Substance and Sexual Activity     Alcohol use: Not Currently     Comment: Last drink 11/16/2020     Drug use: Never     Sexual activity: Not on file   Other Topics Concern     Not on file   Social History Narrative     Not on file     Social Determinants of Health     Financial Resource Strain:      Difficulty of Paying Living Expenses:    Food Insecurity:      Worried About Running Out of Food in the Last Year:      Ran Out of Food in the Last Year:    Transportation Needs:      Lack of Transportation (Medical):      Lack of Transportation (Non-Medical):    Physical Activity:      Days of Exercise per Week:      Minutes of Exercise per Session:    Stress:      Feeling of Stress :    Social Connections:      Frequency of Communication with Friends and Family:      Frequency of Social Gatherings with Friends and Family:      Attends Holiness Services:      Active Member of Clubs or Organizations:      Attends Club or Organization Meetings:      Marital Status:    Intimate Partner Violence:      Fear of Current or Ex-Partner:      Emotionally Abused:      Physically Abused:      Sexually Abused:    Never smoker  Denies alcohol use, last drink 11/15/2020, was drinking a bottle of wine day    Medications:  Current Outpatient Medications   Medication     cholecalciferol (VITAMIN D3) 25 mcg (1000 units) capsule     ibuprofen (MOTRIN IB) 200 MG tablet     No current facility-administered medications for this visit.     Allergies:  No Known Allergies    Objective:  /71 (BP Location: Right arm, Patient Position: Sitting, Cuff Size: Adult Regular)   Pulse 81   Temp 98  F (36.7  C) (Oral)   Resp 18   Ht 1.664 m (5' 5.5\")   Wt 63.9 kg (140 lb 12.8 oz)   SpO2 97%   BMI 23.07 kg/m    Constitutional: pleasant woman in NAD  Eyes: non icteric  Respiratory: Normal respiratory " excursion   MSK: normal range of motion of visualized extremities  Abd: Non distended  Skin: No jaundice  Psychiatric: normal mood and orientation    Labs:    8/2021    AFP 5.8  Alk phos 164  TBR 1.3  DBR 0.6  INR 1.2  Na 139  Creatinine 0.66  Platelets 75    MELD Na 10    11/2020  A1AT 214  Ceruloplasmin 18  Ferritin 724.5  Hepatitis C Ab negative  Hepatitis B Sag negative  Hepatitis B Core total Ab negative  Hepatitis A total ab negative    Patient tells me she had a 24 hour urine copper that was normal.     Imaging:    CT reportedly showing cirrhosis and ascites    RUQ US 4/2021 - fatty liver without masses, small volume ascites    Endoscopy:    Colonoscopy 3/2021 - reports having a small polyp  EGD 3/2021 - per patient no varices.     Independently reviewed labs and imaging.     Assessment/Plan: Ms. Delgado is a 61 year old woman with a history of alcohol use, alcohol related cirrhosis c/b ascites, who presents for follow up of her liver disease.    She appears to have recompensated with prolonged period of sobriety    Discussed the natural history of alcohol related liver disease, risk of progression with return to drinking, risk of HCC, and role of transplant.    She is having some joint pains, will recheck her ferritin. Was likely elevated in the past related to alcohol use    - Will see if we can add on her ferritin to her labs  - HCC Screening: Due for repeat RUQ US 10/2021, AFP 2/2022  - EV screening: EGd 3/2021 without varices, repeat 2-3 years  - Recommend neurology referral for her neuropathy    RTC 6 months    Aiyana Pedersen MD MS  Hepatology/Liver Transplant  [facility]

## 2021-08-25 NOTE — PATIENT INSTRUCTIONS
You are due for your repeat ultrasound 10/2021 - we will send the order to [facility]  You are due for your labs 2/2021

## 2021-08-25 NOTE — NURSING NOTE
"Chief Complaint   Patient presents with     RECHECK     cirrhosis      Complains of right arm going numb at night. States it wakes her up at night.     Vital signs:  Temp: 98  F (36.7  C) Temp src: Oral BP: 124/71 Pulse: 81   Resp: 18 SpO2: 97 %     Height: 166.4 cm (5' 5.5\") (pt reported) Weight: 63.9 kg (140 lb 12.8 oz)  Estimated body mass index is 23.07 kg/m  as calculated from the following:    Height as of this encounter: 1.664 m (5' 5.5\").    Weight as of this encounter: 63.9 kg (140 lb 12.8 oz).        Ragini Ybarra, [facility]  8/25/2021 1:29 PM      "

## 2021-08-26 ENCOUNTER — TELEPHONE (OUTPATIENT)
Dept: GASTROENTEROLOGY | Facility: CLINIC | Age: 61
End: 2021-08-26

## 2021-08-26 NOTE — TELEPHONE ENCOUNTER
----- Message from Aiyana Pedersen MD sent at 8/25/2021  3:36 PM CDT -----  Hey - can you see if the lab can add on a ferritin to her OSH labs just done 8/23?     Otherwise, can you fax her RUQ US order to [facility] to be done 10/2021 and her other labs to be done 2/2022    Thanks    Aiyana

## 2021-08-26 NOTE — TELEPHONE ENCOUNTER
Call to [facility] lab.  said they would be able to add a ferritin to labs from 8/23, order faxed.    Faxed ultrasound due 10/2021 and labs due 2/2022 to [facility]. Asked [place] schedulers to reach out to patient to schedule appointments.    Suri BO LPN  Hepatology Clinic

## 2021-10-11 ENCOUNTER — HEALTH MAINTENANCE LETTER (OUTPATIENT)
Age: 61
End: 2021-10-11

## 2021-11-04 ENCOUNTER — TRANSFERRED RECORDS (OUTPATIENT)
Dept: HEALTH INFORMATION MANAGEMENT | Facility: CLINIC | Age: 61
End: 2021-11-04

## 2021-11-04 DIAGNOSIS — K70.31 ALCOHOLIC CIRRHOSIS OF LIVER WITH ASCITES (H): Primary | ICD-10-CM

## 2021-11-05 ENCOUNTER — TELEPHONE (OUTPATIENT)
Dept: GASTROENTEROLOGY | Facility: CLINIC | Age: 61
End: 2021-11-05

## 2021-11-11 ENCOUNTER — TELEPHONE (OUTPATIENT)
Dept: GASTROENTEROLOGY | Facility: CLINIC | Age: 61
End: 2021-11-11
Payer: COMMERCIAL

## 2021-11-11 NOTE — TELEPHONE ENCOUNTER
[facility] Call Center    Phone Message    May a detailed message be left on voicemail: yes     Reason for Call: Other: Pt called in requesting that orders for MRI and lab be sent to [facility] near her. Please reach out. Thank you.     Action Taken: Message routed to:  [facility] libr[facility] hep    Travel Screening: Not Applicable

## 2021-11-11 NOTE — TELEPHONE ENCOUNTER
Labs and imaging order faxed to [facility] as requested by patient.    Suri BO LPN  Hepatology Clinic

## 2022-03-11 ENCOUNTER — TELEPHONE (OUTPATIENT)
Dept: GASTROENTEROLOGY | Facility: CLINIC | Age: 62
End: 2022-03-11

## 2022-03-11 ENCOUNTER — OFFICE VISIT (OUTPATIENT)
Dept: GASTROENTEROLOGY | Facility: CLINIC | Age: 62
End: 2022-03-11
Attending: STUDENT IN AN ORGANIZED HEALTH CARE EDUCATION/TRAINING PROGRAM
Payer: COMMERCIAL

## 2022-03-11 ENCOUNTER — LAB (OUTPATIENT)
Dept: LAB | Facility: CLINIC | Age: 62
End: 2022-03-11
Attending: STUDENT IN AN ORGANIZED HEALTH CARE EDUCATION/TRAINING PROGRAM

## 2022-03-11 ENCOUNTER — LAB (OUTPATIENT)
Dept: LAB | Facility: CLINIC | Age: 62
End: 2022-03-11
Attending: STUDENT IN AN ORGANIZED HEALTH CARE EDUCATION/TRAINING PROGRAM
Payer: COMMERCIAL

## 2022-03-11 ENCOUNTER — ANCILLARY PROCEDURE (OUTPATIENT)
Dept: MRI IMAGING | Facility: CLINIC | Age: 62
End: 2022-03-11
Attending: STUDENT IN AN ORGANIZED HEALTH CARE EDUCATION/TRAINING PROGRAM
Payer: COMMERCIAL

## 2022-03-11 DIAGNOSIS — K70.31 ALCOHOLIC CIRRHOSIS OF LIVER WITH ASCITES (H): ICD-10-CM

## 2022-03-11 DIAGNOSIS — R79.89 ELEVATED FERRITIN: ICD-10-CM

## 2022-03-11 DIAGNOSIS — R79.89 ELEVATED FERRITIN: Primary | ICD-10-CM

## 2022-03-11 DIAGNOSIS — F10.21 ALCOHOL USE DISORDER, SEVERE, IN EARLY REMISSION (H): ICD-10-CM

## 2022-03-11 LAB
AFP SERPL-MCNC: 5.3 UG/L (ref 0–8)
ALBUMIN SERPL-MCNC: 3.9 G/DL (ref 3.4–5)
ALP SERPL-CCNC: 134 U/L (ref 40–150)
ALT SERPL W P-5'-P-CCNC: 24 U/L (ref 0–50)
ANION GAP SERPL CALCULATED.3IONS-SCNC: 9 MMOL/L (ref 3–14)
AST SERPL W P-5'-P-CCNC: 22 U/L (ref 0–45)
BILIRUB SERPL-MCNC: 0.9 MG/DL (ref 0.2–1.3)
BUN SERPL-MCNC: 6 MG/DL (ref 7–30)
CALCIUM SERPL-MCNC: 9.2 MG/DL (ref 8.5–10.1)
CHLORIDE BLD-SCNC: 105 MMOL/L (ref 94–109)
CO2 SERPL-SCNC: 28 MMOL/L (ref 20–32)
CREAT SERPL-MCNC: 0.66 MG/DL (ref 0.52–1.04)
ERYTHROCYTE [DISTWIDTH] IN BLOOD BY AUTOMATED COUNT: 13.8 % (ref 10–15)
FERRITIN SERPL-MCNC: 268 NG/ML (ref 8–252)
GFR SERPL CREATININE-BSD FRML MDRD: >90 ML/MIN/1.73M2
GLUCOSE BLD-MCNC: 120 MG/DL (ref 70–99)
HCT VFR BLD AUTO: 48.5 % (ref 35–47)
HGB BLD-MCNC: 16 G/DL (ref 11.7–15.7)
INR PPP: 1.11 (ref 0.85–1.15)
IRON SATN MFR SERPL: 35 % (ref 15–46)
IRON SERPL-MCNC: 117 UG/DL (ref 35–180)
LAB DIRECTOR COMMENTS: NORMAL
LAB DIRECTOR DISCLAIMER: NORMAL
LAB DIRECTOR INTERPRETATION: NORMAL
LAB DIRECTOR METHODOLOGY: NORMAL
LAB DIRECTOR RESULTS: NORMAL
MCH RBC QN AUTO: 29.7 PG (ref 26.5–33)
MCHC RBC AUTO-ENTMCNC: 33 G/DL (ref 31.5–36.5)
MCV RBC AUTO: 90 FL (ref 78–100)
PLATELET # BLD AUTO: 95 10E3/UL (ref 150–450)
POTASSIUM BLD-SCNC: 3.7 MMOL/L (ref 3.4–5.3)
PROT SERPL-MCNC: 7.5 G/DL (ref 6.8–8.8)
RBC # BLD AUTO: 5.38 10E6/UL (ref 3.8–5.2)
SODIUM SERPL-SCNC: 142 MMOL/L (ref 133–144)
SPECIMEN DESCRIPTION: NORMAL
TIBC SERPL-MCNC: 333 UG/DL (ref 240–430)
WBC # BLD AUTO: 6.5 10E3/UL (ref 4–11)

## 2022-03-11 PROCEDURE — 85027 COMPLETE CBC AUTOMATED: CPT | Performed by: PATHOLOGY

## 2022-03-11 PROCEDURE — 82105 ALPHA-FETOPROTEIN SERUM: CPT | Mod: 90 | Performed by: PATHOLOGY

## 2022-03-11 PROCEDURE — 85610 PROTHROMBIN TIME: CPT | Performed by: PATHOLOGY

## 2022-03-11 PROCEDURE — 83550 IRON BINDING TEST: CPT | Performed by: PATHOLOGY

## 2022-03-11 PROCEDURE — 99000 SPECIMEN HANDLING OFFICE-LAB: CPT | Performed by: PATHOLOGY

## 2022-03-11 PROCEDURE — 80053 COMPREHEN METABOLIC PANEL: CPT | Performed by: PATHOLOGY

## 2022-03-11 PROCEDURE — 81256 HFE GENE: CPT | Performed by: STUDENT IN AN ORGANIZED HEALTH CARE EDUCATION/TRAINING PROGRAM

## 2022-03-11 PROCEDURE — 74181 MRI ABDOMEN W/O CONTRAST: CPT | Performed by: RADIOLOGY

## 2022-03-11 PROCEDURE — 82728 ASSAY OF FERRITIN: CPT | Performed by: PATHOLOGY

## 2022-03-11 PROCEDURE — G0452 MOLECULAR PATHOLOGY INTERPR: HCPCS | Mod: 26 | Performed by: STUDENT IN AN ORGANIZED HEALTH CARE EDUCATION/TRAINING PROGRAM

## 2022-03-11 PROCEDURE — 99214 OFFICE O/P EST MOD 30 MIN: CPT | Performed by: STUDENT IN AN ORGANIZED HEALTH CARE EDUCATION/TRAINING PROGRAM

## 2022-03-11 PROCEDURE — 36415 COLL VENOUS BLD VENIPUNCTURE: CPT | Performed by: PATHOLOGY

## 2022-03-11 NOTE — LETTER
3/11/2022     RE: Jovana Delgado  [address] [place] [id number]    Dear Colleague,    Thank you for referring your patient, Jovana Delgado, to the [facility] [place]. Please see a copy of my visit note below.    [facility] Return Patient Visit    Date of Visit: 3/11/2022    Reason for referral: Alcohol related liver disease    Subjective: Ms. Delgado is a 62 year old woman with a history of alcohol use, alcohol related cirrhosis c/b ascites, who presents for evaluation of her liver disease.     Initial History:     Presented 11/2020 to the ED with abdominal swelling. Had a CT scan that showed cirrhosis with ascites. Underwent 3.5 L paracentesis. Last drink 2 weeks prior to this. Labs 11/20/2020 significant for PT 16.6 BR 5.1 AST 89 ALT 22 Albumin 3.2 Hgb 13. Unsure if she was yellow at that time.     Was drinking about a bottle of wine a day. Stopped 11/15 because she felt something was wrong. Primary told her that her liver was fatty in the past and mildly elevated LFTs. Told it was fatty liver, not related to drinking. Has not done counseling or treatment. Quit for a month in the past.     Has undergone several paracentesis in the meantime 12/29, 1/19, 2/2, 2/16, 3/2. Usually take 4-5 L, last only 2.1 L removed.    Never tried diuretics, denies issues with sodium or creatinine    Denies a history of GI bleeding, HE.     Interval Events:  - MRE showing advanced fibrosis/cirrhosis, no ascites  - Doing well, no clinical ascites  - Sober from alcohol    ROS: 14 point ROS negative except for positives noted in HPI.    PMHx:  Alcohol related cirrhosis  History of HTN - improved  No history of CAD, heart disease, cancer    PSHx:  Breast Augmentation   Bunionectomy  Colonoscopy   Tubal ligation  Hysterectomy > 20 years ago    FamHx:  Father had a CVA/a. Fib  Mother with breast cancer  No family history of liver disease, liver cancer    SocHx:  Social History      Socioeconomic History     Marital status:      Spouse name: Not on file     Number of children: Not on file     Years of education: Not on file     Highest education level: Not on file   Occupational History     Not on file   Tobacco Use     Smoking status: Never Smoker     Smokeless tobacco: Never Used   Substance and Sexual Activity     Alcohol use: Not Currently     Comment: Last drink 11/16/2020     Drug use: Never     Sexual activity: Not on file   Other Topics Concern     Not on file   Social History Narrative     Not on file     Social Determinants of Health     Financial Resource Strain: Not on file   Food Insecurity: Not on file   Transportation Needs: Not on file   Physical Activity: Not on file   Stress: Not on file   Social Connections: Not on file   Intimate Partner Violence: Not on file   Housing Stability: Not on file   Never smoker  Denies alcohol use, last drink 11/15/2020, was drinking a bottle of wine day    Medications:  Current Outpatient Medications   Medication     ibuprofen (MOTRIN IB) 200 MG tablet     cholecalciferol (VITAMIN D3) 25 mcg (1000 units) capsule     No current facility-administered medications for this visit.     Allergies:  No Known Allergies    Objective:  There were no vitals taken for this visit.  Constitutional: pleasant woman in NAD  Eyes: non icteric  Respiratory: Normal respiratory excursion   MSK: normal range of motion of visualized extremities  Abd: Non distended  Skin: No jaundice  Psychiatric: normal mood and orientation    Labs:    MELD-Na score: 7 at 3/11/2022  8:45 AM  MELD score: 7 at 3/11/2022  8:45 AM  Calculated from:  Serum Creatinine: 0.66 mg/dL (Using min of 1 mg/dL) at 3/11/2022  8:45 AM  Serum Sodium: 142 mmol/L (Using max of 137 mmol/L) at 3/11/2022  8:45 AM  Total Bilirubin: 0.9 mg/dL (Using min of 1 mg/dL) at 3/11/2022  8:45 AM  INR(ratio): 1.11 at 3/11/2022  8:45 AM  Age: 62 years    11/2020  A1AT 214  Ceruloplasmin 18  Ferritin  724.5  Hepatitis C Ab negative  Hepatitis B Sag negative  Hepatitis B Core total Ab negative  Hepatitis A total ab negative    Patient tells me she had a 24 hour urine copper that was normal.     Imaging:    CT reportedly showing cirrhosis and ascites    RUQ US 4/2021 - fatty liver without masses, small volume ascites    RUQ US 11/2021 - hepatomegaly    MRE 3/11/2022 - advanced fibrosis/cirrhosis    Endoscopy:    Colonoscopy 3/2021 - reports having a small polyp  EGD 3/2021 - per patient no varices.     Independently reviewed labs and imaging.     Assessment/Plan: Ms. Delgado is a 62 year old woman with a history of alcohol use, alcohol related cirrhosis c/b ascites, who presents for follow up of her liver disease.    She appears to have recompensated with prolonged period of sobriety. MRE 3/2022 showing fibrosis/cirrhosis.     Discussed the natural history of alcohol related liver disease, risk of progression with return to drinking, risk of HCC, and role of transplant.    Ferritin still elevated, checking HFE gene mutation    - Checking HFE mutation   - HCC Screening: Due for repeat RUQ US 5/2022, AFP 10/2022  - EV screening: EGD 3/2021 without varices, repeat 2-3 years  - Discussed complete abstinence from alcohol including NA beers    Orders Placed This Encounter   Procedures     US Abdomen Limited     US Abdomen Limited     Iron and iron binding capacity     Comprehensive metabolic panel     INR     AFP tumor marker     CBC with platelets       RTC 6 months with RUQ and labs    Aiyana Pedersen MD MS  Hepatology/Liver Transplant  [facility]        Again, thank you for allowing me to participate in the care of your patient.        Sincerely,        Aiyana Pedersen MD

## 2022-03-11 NOTE — PROGRESS NOTES
[facility] Return Patient Visit    Date of Visit: 3/11/2022    Reason for referral: Alcohol related liver disease    Subjective: Ms. Delgado is a 62 year old woman with a history of alcohol use, alcohol related cirrhosis c/b ascites, who presents for evaluation of her liver disease.     Initial History:     Presented 11/2020 to the ED with abdominal swelling. Had a CT scan that showed cirrhosis with ascites. Underwent 3.5 L paracentesis. Last drink 2 weeks prior to this. Labs 11/20/2020 significant for PT 16.6 BR 5.1 AST 89 ALT 22 Albumin 3.2 Hgb 13. Unsure if she was yellow at that time.     Was drinking about a bottle of wine a day. Stopped 11/15 because she felt something was wrong. Primary told her that her liver was fatty in the past and mildly elevated LFTs. Told it was fatty liver, not related to drinking. Has not done counseling or treatment. Quit for a month in the past.     Has undergone several paracentesis in the meantime 12/29, 1/19, 2/2, 2/16, 3/2. Usually take 4-5 L, last only 2.1 L removed.    Never tried diuretics, denies issues with sodium or creatinine    Denies a history of GI bleeding, HE.     Interval Events:  - MRE showing advanced fibrosis/cirrhosis, no ascites  - Doing well, no clinical ascites  - Sober from alcohol    ROS: 14 point ROS negative except for positives noted in HPI.    PMHx:  Alcohol related cirrhosis  History of HTN - improved  No history of CAD, heart disease, cancer    PSHx:  Breast Augmentation   Bunionectomy  Colonoscopy   Tubal ligation  Hysterectomy > 20 years ago    FamHx:  Father had a CVA/a. Fib  Mother with breast cancer  No family history of liver disease, liver cancer    SocHx:  Social History     Socioeconomic History     Marital status:      Spouse name: Not on file     Number of children: Not on file     Years of education: Not on file     Highest education level: Not on file   Occupational History     Not on file   Tobacco Use      Smoking status: Never Smoker     Smokeless tobacco: Never Used   Substance and Sexual Activity     Alcohol use: Not Currently     Comment: Last drink 11/16/2020     Drug use: Never     Sexual activity: Not on file   Other Topics Concern     Not on file   Social History Narrative     Not on file     Social Determinants of Health     Financial Resource Strain: Not on file   Food Insecurity: Not on file   Transportation Needs: Not on file   Physical Activity: Not on file   Stress: Not on file   Social Connections: Not on file   Intimate Partner Violence: Not on file   Housing Stability: Not on file   Never smoker  Denies alcohol use, last drink 11/15/2020, was drinking a bottle of wine day    Medications:  Current Outpatient Medications   Medication     ibuprofen (MOTRIN IB) 200 MG tablet     cholecalciferol (VITAMIN D3) 25 mcg (1000 units) capsule     No current facility-administered medications for this visit.     Allergies:  No Known Allergies    Objective:  There were no vitals taken for this visit.  Constitutional: pleasant woman in NAD  Eyes: non icteric  Respiratory: Normal respiratory excursion   MSK: normal range of motion of visualized extremities  Abd: Non distended  Skin: No jaundice  Psychiatric: normal mood and orientation    Labs:    MELD-Na score: 7 at 3/11/2022  8:45 AM  MELD score: 7 at 3/11/2022  8:45 AM  Calculated from:  Serum Creatinine: 0.66 mg/dL (Using min of 1 mg/dL) at 3/11/2022  8:45 AM  Serum Sodium: 142 mmol/L (Using max of 137 mmol/L) at 3/11/2022  8:45 AM  Total Bilirubin: 0.9 mg/dL (Using min of 1 mg/dL) at 3/11/2022  8:45 AM  INR(ratio): 1.11 at 3/11/2022  8:45 AM  Age: 62 years    11/2020  A1AT 214  Ceruloplasmin 18  Ferritin 724.5  Hepatitis C Ab negative  Hepatitis B Sag negative  Hepatitis B Core total Ab negative  Hepatitis A total ab negative    Patient tells me she had a 24 hour urine copper that was normal.     Imaging:    CT reportedly showing cirrhosis and ascites    RUQ  US 4/2021 - fatty liver without masses, small volume ascites    RUQ US 11/2021 - hepatomegaly    MRE 3/11/2022 - advanced fibrosis/cirrhosis    Endoscopy:    Colonoscopy 3/2021 - reports having a small polyp  EGD 3/2021 - per patient no varices.     Independently reviewed labs and imaging.     Assessment/Plan: Ms. Delgado is a 62 year old woman with a history of alcohol use, alcohol related cirrhosis c/b ascites, who presents for follow up of her liver disease.    She appears to have recompensated with prolonged period of sobriety. MRE 3/2022 showing fibrosis/cirrhosis.     Discussed the natural history of alcohol related liver disease, risk of progression with return to drinking, risk of HCC, and role of transplant.    Ferritin still elevated, checking HFE gene mutation    - Checking HFE mutation   - HCC Screening: Due for repeat RUQ US 5/2022, AFP 10/2022  - EV screening: EGD 3/2021 without varices, repeat 2-3 years  - Discussed complete abstinence from alcohol including NA beers    Orders Placed This Encounter   Procedures     US Abdomen Limited     US Abdomen Limited     Iron and iron binding capacity     Comprehensive metabolic panel     INR     AFP tumor marker     CBC with platelets       RTC 6 months with RUQ and labs    Aiyana Pedersen MD MS  Hepatology/Liver Transplant  [facility]

## 2022-03-11 NOTE — TELEPHONE ENCOUNTER
----- Message from Aiyana Pedersen MD sent at 3/11/2022 10:21 AM CST -----  Hey - can you send her RUQ US order for HCC screening to [facility]?

## 2022-04-21 ENCOUNTER — TRANSFERRED RECORDS (OUTPATIENT)
Dept: HEALTH INFORMATION MANAGEMENT | Facility: CLINIC | Age: 62
End: 2022-04-21
Payer: COMMERCIAL

## 2022-09-16 ENCOUNTER — ANCILLARY PROCEDURE (OUTPATIENT)
Dept: ULTRASOUND IMAGING | Facility: CLINIC | Age: 62
End: 2022-09-16
Attending: STUDENT IN AN ORGANIZED HEALTH CARE EDUCATION/TRAINING PROGRAM
Payer: COMMERCIAL

## 2022-09-16 ENCOUNTER — LAB (OUTPATIENT)
Dept: LAB | Facility: CLINIC | Age: 62
End: 2022-09-16
Attending: STUDENT IN AN ORGANIZED HEALTH CARE EDUCATION/TRAINING PROGRAM

## 2022-09-16 ENCOUNTER — OFFICE VISIT (OUTPATIENT)
Dept: GASTROENTEROLOGY | Facility: CLINIC | Age: 62
End: 2022-09-16
Attending: STUDENT IN AN ORGANIZED HEALTH CARE EDUCATION/TRAINING PROGRAM
Payer: COMMERCIAL

## 2022-09-16 VITALS
SYSTOLIC BLOOD PRESSURE: 109 MMHG | TEMPERATURE: 98.8 F | OXYGEN SATURATION: 96 % | BODY MASS INDEX: 25.22 KG/M2 | HEART RATE: 89 BPM | DIASTOLIC BLOOD PRESSURE: 79 MMHG | WEIGHT: 153.9 LBS

## 2022-09-16 DIAGNOSIS — K70.31 ALCOHOLIC CIRRHOSIS OF LIVER WITH ASCITES (H): ICD-10-CM

## 2022-09-16 DIAGNOSIS — K70.31 ALCOHOLIC CIRRHOSIS OF LIVER WITH ASCITES (H): Primary | ICD-10-CM

## 2022-09-16 LAB
AFP SERPL-MCNC: 4.1 NG/ML
ALBUMIN SERPL BCG-MCNC: 4.4 G/DL (ref 3.5–5.2)
ALP SERPL-CCNC: 124 U/L (ref 35–104)
ALT SERPL W P-5'-P-CCNC: 10 U/L (ref 10–35)
ANION GAP SERPL CALCULATED.3IONS-SCNC: 12 MMOL/L (ref 7–15)
AST SERPL W P-5'-P-CCNC: 26 U/L (ref 10–35)
BILIRUB SERPL-MCNC: 0.9 MG/DL
BUN SERPL-MCNC: 9.4 MG/DL (ref 8–23)
CALCIUM SERPL-MCNC: 9.5 MG/DL (ref 8.8–10.2)
CHLORIDE SERPL-SCNC: 105 MMOL/L (ref 98–107)
CREAT SERPL-MCNC: 0.66 MG/DL (ref 0.51–0.95)
DEPRECATED HCO3 PLAS-SCNC: 24 MMOL/L (ref 22–29)
ERYTHROCYTE [DISTWIDTH] IN BLOOD BY AUTOMATED COUNT: 13.3 % (ref 10–15)
GFR SERPL CREATININE-BSD FRML MDRD: >90 ML/MIN/1.73M2
GLUCOSE SERPL-MCNC: 127 MG/DL (ref 70–99)
HCT VFR BLD AUTO: 45.9 % (ref 35–47)
HGB BLD-MCNC: 15.7 G/DL (ref 11.7–15.7)
INR PPP: 1.07 (ref 0.85–1.15)
MCH RBC QN AUTO: 29.9 PG (ref 26.5–33)
MCHC RBC AUTO-ENTMCNC: 34.2 G/DL (ref 31.5–36.5)
MCV RBC AUTO: 87 FL (ref 78–100)
PLATELET # BLD AUTO: 106 10E3/UL (ref 150–450)
POTASSIUM SERPL-SCNC: 3.9 MMOL/L (ref 3.4–5.3)
PROT SERPL-MCNC: 7.3 G/DL (ref 6.4–8.3)
RBC # BLD AUTO: 5.25 10E6/UL (ref 3.8–5.2)
SODIUM SERPL-SCNC: 141 MMOL/L (ref 136–145)
WBC # BLD AUTO: 6.7 10E3/UL (ref 4–11)

## 2022-09-16 PROCEDURE — 85610 PROTHROMBIN TIME: CPT | Performed by: PATHOLOGY

## 2022-09-16 PROCEDURE — G0463 HOSPITAL OUTPT CLINIC VISIT: HCPCS

## 2022-09-16 PROCEDURE — 99000 SPECIMEN HANDLING OFFICE-LAB: CPT | Performed by: PATHOLOGY

## 2022-09-16 PROCEDURE — 80053 COMPREHEN METABOLIC PANEL: CPT | Performed by: PATHOLOGY

## 2022-09-16 PROCEDURE — 85027 COMPLETE CBC AUTOMATED: CPT | Performed by: PATHOLOGY

## 2022-09-16 PROCEDURE — 76705 ECHO EXAM OF ABDOMEN: CPT | Mod: GC | Performed by: STUDENT IN AN ORGANIZED HEALTH CARE EDUCATION/TRAINING PROGRAM

## 2022-09-16 PROCEDURE — 36415 COLL VENOUS BLD VENIPUNCTURE: CPT | Performed by: PATHOLOGY

## 2022-09-16 PROCEDURE — 99214 OFFICE O/P EST MOD 30 MIN: CPT | Performed by: STUDENT IN AN ORGANIZED HEALTH CARE EDUCATION/TRAINING PROGRAM

## 2022-09-16 PROCEDURE — 82105 ALPHA-FETOPROTEIN SERUM: CPT | Performed by: PATHOLOGY

## 2022-09-16 ASSESSMENT — PAIN SCALES - GENERAL: PAINLEVEL: NO PAIN (0)

## 2022-09-16 NOTE — CONFIDENTIAL NOTE
[facility] Return Patient Visit    Date of Visit: 9/16/2022    Reason for referral: Alcohol related liver disease    Subjective: Ms. Delgado is a 62 year old woman with a history of alcohol use, alcohol related cirrhosis c/b ascites, who presents for evaluation of her liver disease.     Initial History:     Presented 11/2020 to the ED with abdominal swelling. Had a CT scan that showed cirrhosis with ascites. Underwent 3.5 L paracentesis. Last drink 2 weeks prior to this. Labs 11/20/2020 significant for PT 16.6 BR 5.1 AST 89 ALT 22 Albumin 3.2 Hgb 13. Unsure if she was yellow at that time.     Was drinking about a bottle of wine a day. Stopped 11/15 because she felt something was wrong. Primary told her that her liver was fatty in the past and mildly elevated LFTs. Told it was fatty liver, not related to drinking. Has not done counseling or treatment. Quit for a month in the past.     Has undergone several paracentesis in the meantime 12/29, 1/19, 2/2, 2/16, 3/2. Usually take 4-5 L, last only 2.1 L removed.    Never tried diuretics, denies issues with sodium or creatinine    MRE 3/2022 showing advanced fibrosis/cirrhosis, no ascites    Interval Events:  - Doing well, no clinical ascites  - Sober from alcohol  - Seeing orthopedic doctor about back pain - they are considering a course of prednisone and tizanidine    ROS: 14 point ROS negative except for positives noted in HPI.    PMHx:  Alcohol related cirrhosis  History of HTN - improved  No history of CAD, heart disease, cancer    PSHx:  Breast Augmentation   Bunionectomy  Colonoscopy   Tubal ligation  Hysterectomy > 20 years ago    FamHx:  Father had a CVA/a. Fib  Mother with breast cancer  No family history of liver disease, liver cancer    SocHx:  Social History     Socioeconomic History     Marital status:      Spouse name: Not on file     Number of children: Not on file     Years of education: Not on file     Highest education  level: Not on file   Occupational History     Not on file   Tobacco Use     Smoking status: Never Smoker     Smokeless tobacco: Never Used   Substance and Sexual Activity     Alcohol use: Not Currently     Comment: Last drink 11/16/2020     Drug use: Never     Sexual activity: Not on file   Other Topics Concern     Not on file   Social History Narrative     Not on file     Social Determinants of Health     Financial Resource Strain: Not on file   Food Insecurity: Not on file   Transportation Needs: Not on file   Physical Activity: Not on file   Stress: Not on file   Social Connections: Not on file   Intimate Partner Violence: Not on file   Housing Stability: Not on file   Never smoker  Denies alcohol use, last drink 11/15/2020, was drinking a bottle of wine day    Medications:  Current Outpatient Medications   Medication     cholecalciferol (VITAMIN D3) 25 mcg (1000 units) capsule     ibuprofen (ADVIL/MOTRIN) 200 MG tablet     No current facility-administered medications for this visit.     Allergies:  No Known Allergies    Objective:  /79   Pulse 89   Temp 98.8  F (37.1  C)   Wt 69.8 kg (153 lb 14.4 oz)   SpO2 96%   BMI 25.22 kg/m    Constitutional: pleasant woman in NAD  Eyes: non icteric  Respiratory: Normal respiratory excursion   MSK: normal range of motion of visualized extremities  Abd: Non distended  Skin: No jaundice  Psychiatric: normal mood and orientation    Labs:    MELD-Na score: 7 at 9/16/2022  8:57 AM  MELD score: 7 at 9/16/2022  8:57 AM  Calculated from:  Serum Creatinine: 0.66 mg/dL (Using min of 1 mg/dL) at 9/16/2022  8:57 AM  Serum Sodium: 141 mmol/L (Using max of 137 mmol/L) at 9/16/2022  8:57 AM  Total Bilirubin: 0.9 mg/dL (Using min of 1 mg/dL) at 9/16/2022  8:57 AM  INR(ratio): 1.07 at 9/16/2022  8:57 AM  Age: 62 years    11/2020  A1AT 214  Ceruloplasmin 18  Ferritin 724.5  Hepatitis C Ab negative  Hepatitis B Sag negative  Hepatitis B Core total Ab negative  Hepatitis A total ab  negative    3/2022  HFE mutation normal    Patient tells me she had a 24 hour urine copper that was normal.     Imaging:    CT reportedly showing cirrhosis and ascites    RUQ US 4/2021 - fatty liver without masses, small volume ascites    RUQ US 11/2021 - hepatomegaly    MRE 3/11/2022 - advanced fibrosis/cirrhosis    Endoscopy:    Colonoscopy 3/2021 - reports having a small polyp  EGD 3/2021 - per patient no varices.     Independently reviewed labs and imaging.     Assessment/Plan: Ms. Delgado is a 62 year old woman with a history of alcohol use, alcohol related cirrhosis c/b ascites, who presents for follow up of her liver disease.    She appears to have recompensated with prolonged period of sobriety. MRE 3/2022 showing fibrosis/cirrhosis. Discussed the natural history of ALD    Discussed the natural history of alcohol related liver disease, risk of progression with return to drinking, risk of HCC, and role of transplant.    Ok to take short course of prednisone and tizanidine for her back pain. She has not had a history of HE. OK for injections as long as the provider is aware of her platelet count    - HCC Screening with RUQ US and AFP every 6 months  - EV screening: EGD 3/2021 without varices, repeat 3/2023-3/2024  - Discussed complete abstinence from alcohol including NA beers    Orders Placed This Encounter   Procedures     US Abdomen Limited     CBC with platelets     Comprehensive metabolic panel     INR     AFP tumor marker       RTC 6 months with RUQ and labs    Aiyana Pedersen MD MS  Hepatology/Liver Transplant  [facility]

## 2022-09-16 NOTE — NURSING NOTE
Chief Complaint   Patient presents with     RECHECK     Return visit, no new concerns.     Blood pressure 109/79, pulse 89, temperature 98.8  F (37.1  C), weight 69.8 kg (153 lb 14.4 oz), SpO2 96 %.    EMORY HOPSON

## 2022-09-16 NOTE — LETTER
9/16/2022         RE: Jovana Delgado  [address] [facility] [address]        Dear Colleague,    Thank you for referring your patient, Jovana Delgado, to the [facility] [place]. Please see a copy of my visit note below.    No notes on file    Again, thank you for allowing me to participate in the care of your patient.        Sincerely,        Ayiana Pedersen MD

## 2022-09-25 ENCOUNTER — HEALTH MAINTENANCE LETTER (OUTPATIENT)
Age: 62
End: 2022-09-25

## 2023-01-30 ENCOUNTER — HEALTH MAINTENANCE LETTER (OUTPATIENT)
Age: 63
End: 2023-01-30

## 2023-03-01 ENCOUNTER — TELEPHONE (OUTPATIENT)
Dept: GASTROENTEROLOGY | Facility: CLINIC | Age: 63
End: 2023-03-01
Payer: COMMERCIAL

## 2023-03-01 DIAGNOSIS — K70.31 ALCOHOLIC CIRRHOSIS OF LIVER WITH ASCITES (H): Primary | ICD-10-CM

## 2023-03-01 NOTE — TELEPHONE ENCOUNTER
Per Dr. Pedersen ok to change appointment to virtual. Imaging and lab orders faxed to [facility]. Patient updated.    Suri BO LPN  Hepatology Clinic    ----------  [facility] Call Center    Phone Message    May a detailed message be left on voicemail: yes     Reason for Call: Other:     pt is requesting that the orders for their labs and ultrasound be sent  to [facility] in [place], [place] prior to their appt for 03/17. Pt will change their currently scheduled appts if able to do the tests locally.    Pt is also requesting a call back to discuss if their appt may be done virtually.    Action Taken: Message routed to:  [facility]    Travel Screening: Not Applicable

## 2023-03-02 ENCOUNTER — TRANSFERRED RECORDS (OUTPATIENT)
Dept: HEALTH INFORMATION MANAGEMENT | Facility: CLINIC | Age: 63
End: 2023-03-02
Payer: COMMERCIAL

## 2023-03-02 LAB
ALT SERPL-CCNC: 15 INTERNATIONAL_UNITS/L (ref 7–52)
AST SERPL-CCNC: 19 INTERNATIONAL_UNITS/L (ref 13–39)
CREATININE (EXTERNAL): 0.73 MG/DL (ref 0.6–1.2)
GLUCOSE (EXTERNAL): 91 MG/DL (ref 70–105)
INR (EXTERNAL): 1.2 (ref 0.8–1.1)
POTASSIUM (EXTERNAL): 3.9 MEQ/L (ref 3.5–5.1)

## 2023-03-20 ENCOUNTER — DOCUMENTATION ONLY (OUTPATIENT)
Dept: GASTROENTEROLOGY | Facility: CLINIC | Age: 63
End: 2023-03-20
Payer: COMMERCIAL

## 2023-03-20 DIAGNOSIS — K70.30 ALCOHOLIC CIRRHOSIS OF LIVER WITHOUT ASCITES (H): ICD-10-CM

## 2023-03-20 DIAGNOSIS — K70.31 ALCOHOLIC CIRRHOSIS OF LIVER WITH ASCITES (H): Primary | ICD-10-CM

## 2023-03-20 NOTE — CONFIDENTIAL NOTE
Request for patient's most recent RUQ US faxed to [facility] 3/20/2023.    Vanessa BO LPN  Hepatology Clinic

## 2023-03-27 ENCOUNTER — TELEPHONE (OUTPATIENT)
Dept: GASTROENTEROLOGY | Facility: CLINIC | Age: 63
End: 2023-03-27
Payer: COMMERCIAL

## 2023-03-27 NOTE — TELEPHONE ENCOUNTER
Screening Questions  BLUE  KIND OF PREP RED  LOCATION [review exclusion criteria] GREEN  SEDATION TYPE        Y Are you active on mychart?       ROSHAN KIRAN Ordering/Referring Provider?        BCBS What type of coverage do you have?      N Have you had a positive covid test in the last 14 days?     24.6 1. BMI  [BMI 40+ - review exclusion criteria]    Y  2. Are you able to give consent for your medical care? [IF NO,RN REVIEW]          N  3. Are you taking any prescription pain medications on a routine schedule   (ex narcotics: oxycodone, roxicodone, oxycontin,  and percocet)? [RN Review]          3a. EXTENDED PREP What kind of prescription?     N 4. Do you have any chemical dependencies such as alcohol, street drugs, or methadone?        **If yes 3- 5 , please schedule with MAC sedation.**          IF YES TO ANY 6 - 10 - HOSPITAL SETTING ONLY.     N 6.   Do you need assistance transferring?     N 7.   Have you had a heart or lung transplant?    N 8.   Are you currently on dialysis?   N 9.   Do you use daily home oxygen?   N 10. Do you take nitroglycerin?   10a.  If yes, how often?     11. [FEMALES]   Are you currently pregnant?    11a.  If yes, how many weeks? [ Greater than 12 weeks, OR NEEDED]    N 12. Do you have Pulmonary Hypertension? *NEED PAC APPT AT [facility] w/ MAC*     N 13. [review exclusion criteria]  Do you have any implantable devices in your body (pacemaker, defib, LVAD)?    N 14. In the past 6 months, have you had any heart related issues including cardiomyopathy or heart attack?     14a.  If yes, did it require cardiac stenting if so when?     N 15. Have you had a stroke or Transient ischemic attack (TIA - aka  mini stroke ) within 6 months?      N 16. Do you have mod to severe Obstructive Sleep Apnea?  [Hospital only]    N 17. Do you have SEVERE AND UNCONTROLLED asthma? *NEED PAC APPT AT [facility] w/MAC*     18. Are you currently taking any blood thinners?     18a. No. Continue to 19.   18b. Yes/no  "Blood Thinner: No. Inform patient to \"follow up w/ ordering provider for bridging instructions.\"    N 19. Do you take the medication Phentermine?    19a. If yes, \"Hold for 7 days before procedure.  Please consult your prescribing provider if you have questions about holding this medication.\"     N  20. Do you have chronic kidney disease?      N  21. Do you have a diagnosis of diabetes?      23. Preferred LOCAL Pharmacy for Pre Prescription    [ LIST ONLY ONE PHARMACY]     [facility] - [place], [place] - [address]    - CLOSING REMINDERS -    Informed patient they will need an adult    Cannot take any type of public or medical transportation alone    Conscious Sedation- Needs  for 6 hours after the procedure       MAC/General-Needs  for 24 hours after procedure    Pre-Procedure Covid test to be completed [[facility] PCR Testing Required]    Confirmed Nurse will call to complete assessment       - SCHEDULING DETAILS -  NO Hospital Setting Required? If yes, what is the exclusion?:    MAGNO  Surgeon    6/1/2023  Date of Procedure  Upper Endoscopy [EGD]  Type of Procedure Scheduled  [facility]     MAC PER ORDER Sedation Type     NO PAC / Pre-op Required                 "

## 2023-05-16 ENCOUNTER — TELEPHONE (OUTPATIENT)
Dept: GASTROENTEROLOGY | Facility: CLINIC | Age: 63
End: 2023-05-16
Payer: COMMERCIAL

## 2023-05-16 NOTE — TELEPHONE ENCOUNTER
Patient scheduled for Upper endoscopy (EGD) on 6.1.23.     Discuss Covid policy.     Pre op exam needed? N/A    Arrival time: 1000. Procedure time 1100    Facility location: [facility]; [address] [place] [address], [address] Floor, [place] [id number]    Sedation type: MAC    NSAIDs? Yes.  Ibuprofen (Advil, Motrin).  Holding interval of 1 day before procedure. (Verify if still taking)    Anticoagulations? No    Electronic implanted devices? No    Diabetic? No    Indication for procedure: varices      Pre visit planning completed.    Sophia Arellano RN  Endoscopy Procedure Pre Assessment RN

## 2023-05-17 NOTE — TELEPHONE ENCOUNTER
Patient returned call.     Pre assessment questions completed for upcoming Upper endoscopy (EGD) procedure scheduled on 6/1/23    COVID policy reviewed.     Pre-op exam? N/A    Reviewed procedural arrival time 1000, procedure time 1100 and facility location [facility]; [address] [place] [address], [address] Floor, [place] [address]    Designated  policy reviewed. Instructed to have someone stay 24 hours post procedure.     NSAIDs? Yes.  Ibuprofen (Advil, Motrin).  Holding interval of 1 day before procedure.     Anticoagulation/blood thinners? No    Electronic implanted devices? No    Diabetic? No    Reviewed procedure prep instructions.     Patient verbalized understanding and had no questions or concerns at this time.    Danielle Mancilla RN  Endoscopy Procedure Pre Assessment RN

## 2023-05-17 NOTE — TELEPHONE ENCOUNTER
Attempted to contact patient regarding upcoming Upper endoscopy (EGD) procedure on 6.1.23 for pre assessment questions.     Patient answered the phone however states unable to talk at this time.    Provided number to return call to [phone] #[phone]        Sophia Arellano RN  Endoscopy Procedure Pre Assessment RN

## 2023-06-01 ENCOUNTER — ANESTHESIA EVENT (OUTPATIENT)
Dept: SURGERY | Facility: AMBULATORY SURGERY CENTER | Age: 63
End: 2023-06-01
Payer: COMMERCIAL

## 2023-06-01 ENCOUNTER — HOSPITAL ENCOUNTER (OUTPATIENT)
Facility: AMBULATORY SURGERY CENTER | Age: 63
Discharge: HOME | End: 2023-06-01
Attending: STUDENT IN AN ORGANIZED HEALTH CARE EDUCATION/TRAINING PROGRAM
Payer: COMMERCIAL

## 2023-06-01 ENCOUNTER — ANESTHESIA (OUTPATIENT)
Dept: SURGERY | Facility: AMBULATORY SURGERY CENTER | Age: 63
End: 2023-06-01
Payer: COMMERCIAL

## 2023-06-01 VITALS
WEIGHT: 153 LBS | SYSTOLIC BLOOD PRESSURE: 108 MMHG | HEIGHT: 66 IN | TEMPERATURE: 97.7 F | RESPIRATION RATE: 16 BRPM | DIASTOLIC BLOOD PRESSURE: 80 MMHG | OXYGEN SATURATION: 97 % | HEART RATE: 91 BPM | BODY MASS INDEX: 24.59 KG/M2

## 2023-06-01 VITALS — HEART RATE: 99 BPM

## 2023-06-01 DIAGNOSIS — K74.60 CIRRHOSIS OF LIVER WITHOUT ASCITES, UNSPECIFIED HEPATIC CIRRHOSIS TYPE (H): Primary | ICD-10-CM

## 2023-06-01 LAB — UPPER GI ENDOSCOPY: NORMAL

## 2023-06-01 PROCEDURE — 88305 TISSUE EXAM BY PATHOLOGIST: CPT | Mod: 26 | Performed by: PATHOLOGY

## 2023-06-01 PROCEDURE — 43239 EGD BIOPSY SINGLE/MULTIPLE: CPT

## 2023-06-01 PROCEDURE — 88305 TISSUE EXAM BY PATHOLOGIST: CPT | Mod: TC | Performed by: STUDENT IN AN ORGANIZED HEALTH CARE EDUCATION/TRAINING PROGRAM

## 2023-06-01 RX ORDER — ONDANSETRON 2 MG/ML
4 INJECTION INTRAMUSCULAR; INTRAVENOUS
Status: DISCONTINUED | OUTPATIENT
Start: 2023-06-01 | End: 2023-06-01 | Stop reason: HOSPADM

## 2023-06-01 RX ORDER — ONDANSETRON 2 MG/ML
4 INJECTION INTRAMUSCULAR; INTRAVENOUS EVERY 6 HOURS PRN
Status: DISCONTINUED | OUTPATIENT
Start: 2023-06-01 | End: 2023-06-02 | Stop reason: HOSPADM

## 2023-06-01 RX ORDER — FLUMAZENIL 0.1 MG/ML
0.2 INJECTION, SOLUTION INTRAVENOUS
Status: ACTIVE | OUTPATIENT
Start: 2023-06-01 | End: 2023-06-01

## 2023-06-01 RX ORDER — SIMETHICONE
LIQUID (ML) MISCELLANEOUS PRN
Status: DISCONTINUED | OUTPATIENT
Start: 2023-06-01 | End: 2023-06-01 | Stop reason: HOSPADM

## 2023-06-01 RX ORDER — NALOXONE HYDROCHLORIDE 0.4 MG/ML
0.2 INJECTION, SOLUTION INTRAMUSCULAR; INTRAVENOUS; SUBCUTANEOUS
Status: DISCONTINUED | OUTPATIENT
Start: 2023-06-01 | End: 2023-06-02 | Stop reason: HOSPADM

## 2023-06-01 RX ORDER — PROCHLORPERAZINE MALEATE 10 MG
10 TABLET ORAL EVERY 6 HOURS PRN
Status: DISCONTINUED | OUTPATIENT
Start: 2023-06-01 | End: 2023-06-02 | Stop reason: HOSPADM

## 2023-06-01 RX ORDER — LIDOCAINE HYDROCHLORIDE 20 MG/ML
INJECTION, SOLUTION INFILTRATION; PERINEURAL PRN
Status: DISCONTINUED | OUTPATIENT
Start: 2023-06-01 | End: 2023-06-01

## 2023-06-01 RX ORDER — PROPOFOL 10 MG/ML
INJECTION, EMULSION INTRAVENOUS CONTINUOUS PRN
Status: DISCONTINUED | OUTPATIENT
Start: 2023-06-01 | End: 2023-06-01

## 2023-06-01 RX ORDER — NALOXONE HYDROCHLORIDE 0.4 MG/ML
0.4 INJECTION, SOLUTION INTRAMUSCULAR; INTRAVENOUS; SUBCUTANEOUS
Status: DISCONTINUED | OUTPATIENT
Start: 2023-06-01 | End: 2023-06-02 | Stop reason: HOSPADM

## 2023-06-01 RX ORDER — ONDANSETRON 4 MG/1
4 TABLET, ORALLY DISINTEGRATING ORAL EVERY 6 HOURS PRN
Status: DISCONTINUED | OUTPATIENT
Start: 2023-06-01 | End: 2023-06-02 | Stop reason: HOSPADM

## 2023-06-01 RX ORDER — LIDOCAINE 40 MG/G
CREAM TOPICAL
Status: DISCONTINUED | OUTPATIENT
Start: 2023-06-01 | End: 2023-06-01 | Stop reason: HOSPADM

## 2023-06-01 RX ORDER — PROPOFOL 10 MG/ML
INJECTION, EMULSION INTRAVENOUS PRN
Status: DISCONTINUED | OUTPATIENT
Start: 2023-06-01 | End: 2023-06-01

## 2023-06-01 RX ORDER — SODIUM CHLORIDE, SODIUM LACTATE, POTASSIUM CHLORIDE, CALCIUM CHLORIDE 600; 310; 30; 20 MG/100ML; MG/100ML; MG/100ML; MG/100ML
INJECTION, SOLUTION INTRAVENOUS CONTINUOUS PRN
Status: DISCONTINUED | OUTPATIENT
Start: 2023-06-01 | End: 2023-06-01

## 2023-06-01 RX ADMIN — PROPOFOL 100 MG: 10 INJECTION, EMULSION INTRAVENOUS at 10:27

## 2023-06-01 RX ADMIN — SODIUM CHLORIDE, SODIUM LACTATE, POTASSIUM CHLORIDE, CALCIUM CHLORIDE: 600; 310; 30; 20 INJECTION, SOLUTION INTRAVENOUS at 10:08

## 2023-06-01 RX ADMIN — LIDOCAINE HYDROCHLORIDE 70 MG: 20 INJECTION, SOLUTION INFILTRATION; PERINEURAL at 10:27

## 2023-06-01 RX ADMIN — PROPOFOL 200 MCG/KG/MIN: 10 INJECTION, EMULSION INTRAVENOUS at 10:27

## 2023-06-01 NOTE — ANESTHESIA POSTPROCEDURE EVALUATION
Patient: Jovana Delgado    Procedure: Procedure(s):  Esophagoscopy, gastroscopy, duodenoscopy (EGD), combined       Anesthesia Type:  MAC    Note:  Disposition: Outpatient   Postop Pain Control: Uneventful            Sign Out: Well controlled pain   PONV: No   Neuro/Psych: Uneventful            Sign Out: Acceptable/Baseline neuro status   Airway/Respiratory: Uneventful            Sign Out: Acceptable/Baseline resp. status   CV/Hemodynamics: Uneventful            Sign Out: Acceptable CV status; No obvious hypovolemia; No obvious fluid overload   Other NRE: NONE   DID A NON-ROUTINE EVENT OCCUR?            Last vitals:  Vitals Value Taken Time   /80 06/01/23 1100   Temp 36.5  C (97.7  F) 06/01/23 1100   Pulse 91 06/01/23 1100   Resp 16 06/01/23 1100   SpO2 97 % 06/01/23 1100       Electronically Signed By: Terry Capone MD  June 1, 2023  11:33 AM

## 2023-06-01 NOTE — ANESTHESIA PREPROCEDURE EVALUATION
Anesthesia Pre-Procedure Evaluation    Patient: Jovana Delgado   MRN: [id number] : 1960        Procedure : Procedure(s):  Esophagoscopy, gastroscopy, duodenoscopy (EGD), combined          No past medical history on file.   History reviewed. No pertinent surgical history.   No Known Allergies   Social History     Tobacco Use     Smoking status: Never     Smokeless tobacco: Never   Vaping Use     Vaping status: Not on file   Substance Use Topics     Alcohol use: Not Currently     Comment: Last drink 2020      Wt Readings from Last 1 Encounters:   22 69.8 kg (153 lb 14.4 oz)        Anesthesia Evaluation            ROS/MED HX  ENT/Pulmonary:       Neurologic:       Cardiovascular:     (+) hypertension-----    METS/Exercise Tolerance:     Hematologic:       Musculoskeletal:       GI/Hepatic:    Liver disease: alcoholic cirrhosis.   Renal/Genitourinary:       Endo:       Psychiatric/Substance Use:       Infectious Disease:       Malignancy:       Other:            Physical Exam    Airway        Mallampati: II       Respiratory Devices and Support         Dental           Cardiovascular          Rhythm and rate: regular     Pulmonary           breath sounds clear to auscultation           OUTSIDE LABS:  CBC:   Lab Results   Component Value Date    WBC 6.7 2022    WBC 6.5 2022    HGB 15.7 2022    HGB 16.0 (H) 2022    HCT 45.9 2022    HCT 48.5 (H) 2022     (L) 2022    PLT 95 (L) 2022     BMP:   Lab Results   Component Value Date     2022     2022    POTASSIUM 3.9 2022    POTASSIUM 3.7 2022    CHLORIDE 105 2022    CHLORIDE 105 2022    CO2 24 2022    CO2 28 2022    BUN 9.4 2022    BUN 6 (L) 2022    CR 0.66 2022    CR 0.66 2022     (H) 2022     (H) 2022     COAGS:   Lab Results   Component Value Date    INR 1.2 (A) 2023     POC: No results  found for: BGM, HCG, HCGS  HEPATIC:   Lab Results   Component Value Date    ALBUMIN 4.4 09/16/2022    PROTTOTAL 7.3 09/16/2022    ALT 10 09/16/2022    AST 26 09/16/2022    ALKPHOS 124 (H) 09/16/2022    BILITOTAL 0.9 09/16/2022     OTHER:   Lab Results   Component Value Date    ARIK 9.5 09/16/2022       Anesthesia Plan    ASA Status:  3   NPO Status:  NPO Appropriate    Anesthesia Type: MAC.     - Reason for MAC: immobility needed              Consents    Anesthesia Plan(s) and associated risks, benefits, and realistic alternatives discussed. Questions answered and patient/representative(s) expressed understanding.    - Discussed:     - Discussed with:  Patient         Postoperative Care            Comments:                Terry Capone MD

## 2023-06-01 NOTE — ANESTHESIA CARE TRANSFER NOTE
Patient: Jovana Delgado    Procedure: Procedure(s):  Esophagoscopy, gastroscopy, duodenoscopy (EGD), combined       Diagnosis: Alcoholic cirrhosis of liver without ascites (H) [K70.30]  Diagnosis Additional Information: No value filed.    Anesthesia Type:   MAC     Note:    Oropharynx: oropharynx clear of all foreign objects  Level of Consciousness: awake  Oxygen Supplementation: room air    Independent Airway: airway patency satisfactory and stable  Dentition: dentition unchanged  Vital Signs Stable: post-procedure vital signs reviewed and stable    Patient transferred to: Phase II  Comments: VSS and WNL, comfortable, no PONV, report to Diane GUIDO  Handoff Report: Identifed the Patient, Identified the Reponsible Provider, Reviewed the pertinent medical history, Discussed the surgical course, Reviewed Intra-OP anesthesia mangement and issues during anesthesia, Set expectations for post-procedure period and Allowed opportunity for questions and acknowledgement of understanding      Vitals:  Vitals Value Taken Time   /75 06/01/23 1043   Temp 36.1  C (97  F) 06/01/23 1043   Pulse 100 06/01/23 1043   Resp 14 06/01/23 1043   SpO2 94 % 06/01/23 1043       Electronically Signed By: HERNANDEZ Cook CRNA  June 1, 2023  10:44 AM

## 2023-06-01 NOTE — H&P
Jovana Delgado  [id number]  female  63 year old      Reason for procedure/surgery: EGD to screen for varices    There is no problem list on file for this patient.      Past Surgical History:  History reviewed. No pertinent surgical history.    Past Medical History: No past medical history on file.    Social History:   Social History     Tobacco Use     Smoking status: Never     Smokeless tobacco: Never   Vaping Use     Vaping status: Not on file   Substance Use Topics     Alcohol use: Not Currently     Comment: Last drink 11/16/2020       Family History: History reviewed. No pertinent family history.    Allergies: No Known Allergies    Active Medications:   Current Outpatient Medications   Medication Sig Dispense Refill     cholecalciferol (VITAMIN D3) 25 mcg (1000 units) capsule Take 1 capsule by mouth daily       ibuprofen (ADVIL/MOTRIN) 200 MG tablet Take 200 mg by mouth every 4 hours as needed for mild pain (rarely takes)         Systemic Review:   CONSTITUTIONAL: NEGATIVE for fever, chills, change in weight  ENT/MOUTH: NEGATIVE for ear, mouth and throat problems  RESP: NEGATIVE for significant cough or SOB  CV: NEGATIVE for chest pain, palpitations or peripheral edema    Physical Examination:   Vital Signs: There were no vitals taken for this visit.  GENERAL: healthy, alert and no distress  NECK: no adenopathy, no asymmetry, masses, or scars  RESP: lungs clear to auscultation - no rales, rhonchi or wheezes  CV: regular rate and rhythm, normal S1 S2, no S3 or S4, no murmur, click or rub, no peripheral edema and peripheral pulses strong  ABDOMEN: soft, nontender, no hepatosplenomegaly, no masses and bowel sounds normal  MS: no gross musculoskeletal defects noted, no edema      Plan: Appropriate to proceed as scheduled.      Aiyana Pedersen MD  6/1/2023    PCP:  No Ref-Primary, Physician

## 2023-06-02 LAB
PATH REPORT.COMMENTS IMP SPEC: NORMAL
PATH REPORT.COMMENTS IMP SPEC: NORMAL
PATH REPORT.FINAL DX SPEC: NORMAL
PATH REPORT.GROSS SPEC: NORMAL
PATH REPORT.MICROSCOPIC SPEC OTHER STN: NORMAL
PATH REPORT.RELEVANT HX SPEC: NORMAL
PHOTO IMAGE: NORMAL

## 2023-07-06 ENCOUNTER — PREP FOR PROCEDURE (OUTPATIENT)
Dept: GASTROENTEROLOGY | Facility: CLINIC | Age: 63
End: 2023-07-06
Payer: COMMERCIAL

## 2023-07-06 ENCOUNTER — PATIENT OUTREACH (OUTPATIENT)
Dept: GASTROENTEROLOGY | Facility: CLINIC | Age: 63
End: 2023-07-06
Payer: COMMERCIAL

## 2023-07-06 DIAGNOSIS — K22.9 ESOPHAGEAL LESION: Primary | ICD-10-CM

## 2023-07-06 NOTE — TELEPHONE ENCOUNTER
Called pt to discuss    Procedure/Imaging/Clinic: EGD with EMR   Physician: Bhanu   Timing: next avail   Scope time needed: 15 min   Anesthesia: MAC   Dx: esophageal squamous papilloma   Tier: 3   Location: [place] or [facility]   Header of letter for pt communication: upper endoscopy    Discussed date of 8/10 at [place], pt in agreement.    Explained they will need a , someone to stay with them for 24 hours and should stay in town for 24 hours (within 45 min of Hospital) post procedure    Patient needs to get pre-op physical completed. If outside [facility] system will need physical faxed to number [phone]   If you do not get a preop physical, your procedure could be cancelled, patient voiced understanding*    Preop Plan: PAC visit to schedule, provided phone number for scheduling.    Does patient have any history of gastric bypass/gastric surgery/altered panc/bili anatomy? none    Does patient have [employer] insurance?: no    Med Review    Blood thinner -  none  ASA - none  Diabetic - none  Any meds by injection or mouth for weight loss or diabetes- none    Patient Education r/t procedure: kimberly    A pre-op nurse will call 1-2 days prior to the procedure.    NPO/Prep:   Adults and Children of all ages may consume solids up to 8 hours prior to arrival time - may consume clear liquids up to 1 hour prior to arrival time.    Verbalized understanding of all instructions. All questions answered.     Procedure order placed, message routed to OR      Shannon Glaser, RN, BSN,   [facility]  Care coordinator

## 2023-07-10 NOTE — TELEPHONE ENCOUNTER
FUTURE VISIT INFORMATION      SURGERY INFORMATION:    Date: 8/10/2023    Location:  GI    Surgeon:  Jose Drummond MD    Anesthesia Type:  MAC    Procedure: ESOPHAGOGASTRODUODENOSCOPY, WITH MUCOSAL RESECTION    RECORDS REQUESTED FROM:

## 2023-07-25 ENCOUNTER — PRE VISIT (OUTPATIENT)
Dept: SURGERY | Facility: CLINIC | Age: 63
End: 2023-07-25

## 2023-07-25 ENCOUNTER — PATIENT OUTREACH (OUTPATIENT)
Dept: GASTROENTEROLOGY | Facility: CLINIC | Age: 63
End: 2023-07-25

## 2023-07-25 NOTE — TELEPHONE ENCOUNTER
Pt called with update that virtual PAC appt is not possible d/t procedure being performed at [facility]. In person PAC visit would need to be arranged. This was confirmed with PAC scheduling, appt cancelled for today.   Pt will make in person visit PAC visit or try to see a local provider. Has scheduling number to make appt if needed and fax number for outside pre op report to be sent to if done locally in [place].    Shannon Glaser, RN, BSN,   [facility]  Care coordinator

## 2023-08-09 ENCOUNTER — ANESTHESIA EVENT (OUTPATIENT)
Dept: GASTROENTEROLOGY | Facility: CLINIC | Age: 63
End: 2023-08-09
Payer: COMMERCIAL

## 2023-08-09 ENCOUNTER — OFFICE VISIT (OUTPATIENT)
Dept: SURGERY | Facility: CLINIC | Age: 63
End: 2023-08-09
Payer: COMMERCIAL

## 2023-08-09 ENCOUNTER — PRE VISIT (OUTPATIENT)
Dept: SURGERY | Facility: CLINIC | Age: 63
End: 2023-08-09

## 2023-08-09 VITALS
HEART RATE: 81 BPM | WEIGHT: 153.9 LBS | DIASTOLIC BLOOD PRESSURE: 97 MMHG | BODY MASS INDEX: 24.73 KG/M2 | SYSTOLIC BLOOD PRESSURE: 152 MMHG | OXYGEN SATURATION: 96 % | RESPIRATION RATE: 16 BRPM | TEMPERATURE: 98.3 F | HEIGHT: 66 IN

## 2023-08-09 DIAGNOSIS — K22.9 ESOPHAGEAL LESION: ICD-10-CM

## 2023-08-09 DIAGNOSIS — Z01.818 PREOP EXAMINATION: Primary | ICD-10-CM

## 2023-08-09 PROCEDURE — 99202 OFFICE O/P NEW SF 15 MIN: CPT | Performed by: NURSE PRACTITIONER

## 2023-08-09 RX ORDER — TIZANIDINE 2 MG/1
1-2 TABLET ORAL
COMMUNITY
Start: 2023-07-11

## 2023-08-09 RX ORDER — BETAMETHASONE DIPROPIONATE 0.5 MG/G
OINTMENT, AUGMENTED TOPICAL PRN
COMMUNITY
Start: 2023-05-22

## 2023-08-09 ASSESSMENT — PAIN SCALES - GENERAL: PAINLEVEL: NO PAIN (0)

## 2023-08-09 NOTE — PATIENT INSTRUCTIONS
Name:  Jovana Delgado   MRN:  [id number]   :  1960   Today's Date:  2023         You were seen today for a pre-operative assessment in the:    Pre-operative Anesthesia Assessment Center(PAC)  [facility]  [address] [place] [id number]  phone [phone]      You will be receiving a call with location, date, arrival time and diet instructions from Preadmission Nursing at your surgical site:    -[facility]: [phone]   -[facility]: [phone]  -[facility]: [phone]  -[facility]: [phone]  -[facility]: [phone]  -[facility]: [phone]      Anesthesia recommendations for medications:    Hold Aspirin for 7 days before procedure.  Hold Multivitamins for 7 days before procedure. (Vitamin D3)  Hold Herbal medications and Supplements for 7 days before procedure.  Hold Ibuprofen for 1 day before procedure.   Hold Naproxen for 4 days before procedure.     No alcohol or cannabis products for 24 hours before your procedure     Please DO NOT take the following medications the day of procedure:  None    Please take these medications the day of procedure:  None.      How do I prepare myself?  - Please take 2 showers (one the night prior to surgery and one the morning of surgery) using Scrubcare or Hibiclens soap.    Use this soap only from the neck to your toes.     Leave the soap on your skin for one minute--then rinse thoroughly.      You may use your own shampoo and conditioner. No other hair products.   - Please remove all jewelry and body piercings.  - No lotions, deodorants or fragrance.  - No makeup or fingernail polish.   - Bring your ID and insurance card.    -If you have a Deep Brain Stimulator, a Spinal Cord Stimulator, or any implanted Neuro Device, you must bring the remote to your appointment       For further questions regarding your surgery please call your surgeon's office.

## 2023-08-09 NOTE — H&P
Pre-Operative H & P     CC:  Preoperative exam to assess for increased cardiopulmonary risk while undergoing surgery and anesthesia.    Date of Encounter: 8/9/2023  Primary Care Physician:  No Ref-Primary, Physician     Reason for visit:   Encounter Diagnoses   Name Primary?    Preop examination Yes    Esophageal lesion        HPI  Jovana Delgado is a 63 year old female who presents for pre-operative H & P in preparation for  Procedure Information       Case: [id number] Date/Time: 08/10/23 1400    Procedure: ESOPHAGOGASTRODUODENOSCOPY, WITH MUCOSAL RESECTION (Esophagus)    Anesthesia type: MAC    Diagnosis: Esophageal lesion [K22.9]    Pre-op diagnosis: Esophageal lesion [K22.9]    Location:  GI  01 /  GI    Providers: Jose Drummond MD            Jovana Delgado is a 63 year old female with alcoholic liver disease and alcohol abuse in remission that has an esophageal lesion.  She has been following here with Dr. Pedersen in hepatology for her liver disease.  An EGD was done on 6/1/23 and a gastroesophageal junction polyp was found and biopsied.  The biopsy was negative for malignancy.  The above listed procedure has now been recommended for further evaluation and treatment.     History is obtained from the patient and chart review    Hx of abnormal bleeding or anti-platelet use: none    Menstrual history: No LMP recorded. Patient has had a hysterectomy.:      Past Medical History  Past Medical History:   Diagnosis Date    Alcoholic liver disease (H)     Cirrhosis of liver (H)     Portal hypertensive gastropathy (H)        Past Surgical History  Past Surgical History:   Procedure Laterality Date    CARPAL TUNNEL RELEASE RT/LT Bilateral     COLONOSCOPY      ESOPHAGOSCOPY, GASTROSCOPY, DUODENOSCOPY (EGD), COMBINED N/A 06/01/2023    Procedure: Esophagoscopy, gastroscopy, duodenoscopy (EGD), combined;  Surgeon: Aiyana Pedersen MD;  Location: [facility] OR    FOOT SURGERY Bilateral     great toe fusions    HYSTERECTOMY          Prior to Admission Medications  Current Outpatient Medications   Medication Sig Dispense Refill    augmented betamethasone dipropionate (DIPROLENE-AF) 0.05 % external ointment Apply topically as needed      cholecalciferol (VITAMIN D3) 25 mcg (1000 units) capsule Take 1 capsule by mouth daily      ibuprofen (ADVIL/MOTRIN) 200 MG tablet Take 200 mg by mouth every 4 hours as needed for mild pain (rarely takes)      tiZANidine (ZANAFLEX) 2 MG tablet Take 1-2 tablets by mouth nightly as needed         Allergies  No Known Allergies    Social History  Social History     Socioeconomic History    Marital status:      Spouse name: Not on file    Number of children: 2    Years of education: Not on file    Highest education level: Not on file   Occupational History    Occupation: unemployed   Tobacco Use    Smoking status: Never    Smokeless tobacco: Never   Substance and Sexual Activity    Alcohol use: Not Currently     Comment: Last drink 11/16/2020    Drug use: Never    Sexual activity: Not on file   Other Topics Concern    Not on file   Social History Narrative    Not on file     Social Determinants of Health     Financial Resource Strain: Not on file   Food Insecurity: Not on file   Transportation Needs: Not on file   Physical Activity: Not on file   Stress: Not on file   Social Connections: Not on file   Intimate Partner Violence: Not on file   Housing Stability: Not on file       Family History  Family History   Problem Relation Age of Onset    Cerebrovascular Disease Father     Atrial fibrillation Father     Anesthesia Reaction No family hx of     Thrombosis No family hx of        Review of Systems  The complete review of systems is negative other than noted in the HPI or here.   Anesthesia Evaluation   Pt has had prior anesthetic.     No history of anesthetic complications       ROS/MED HX  ENT/Pulmonary:  - neg pulmonary ROS     Neurologic:  - neg neurologic ROS     Cardiovascular:     (+)  - -   -  - -   "                               No previous cardiac testing     METS/Exercise Tolerance: >4 METS Comment: Walks 1 mile occasionally for exercise. Denies any exertional dyspnea or angina.    Hematologic:  - neg hematologic  ROS     Musculoskeletal:  - neg musculoskeletal ROS     GI/Hepatic:     (+)             liver disease,       Renal/Genitourinary:  - neg Renal ROS     Endo:  - neg endo ROS     Psychiatric/Substance Use:     (+)   alcohol abuse      Infectious Disease:  - neg infectious disease ROS     Malignancy:  - neg malignancy ROS     Other:  - neg other ROS          BP (!) 152/97 (BP Location: Right arm, Patient Position: Sitting, Cuff Size: Adult Regular)   Pulse 81   Temp 98.3  F (36.8  C) (Oral)   Resp 16   Ht 1.664 m (5' 5.5\")   Wt 69.8 kg (153 lb 14.4 oz)   SpO2 96%   Breastfeeding No   BMI 25.22 kg/m      Physical Exam   Constitutional: Awake, alert, cooperative, no apparent distress, and appears stated age.  Eyes: Pupils equal, round and reactive to light, extra ocular muscles intact, sclera clear, conjunctiva normal.  HENT: Normocephalic, oral pharynx with moist mucus membranes, good dentition. No goiter appreciated.   Respiratory: Clear to auscultation bilaterally, no crackles or wheezing.  Cardiovascular: Regular rate and rhythm, normal S1 and S2, and no murmur noted.  Carotids +2, no bruits. No edema. Palpable pulses to radial  DP and PT arteries.   GI: Normal bowel sounds, soft, non-distended, non-tender, no masses palpated, no hepatosplenomegaly.    Lymph/Hematologic: No cervical lymphadenopathy and no supraclavicular lymphadenopathy.  Genitourinary:  deferred  Skin: Warm and dry.    Musculoskeletal: Full ROM of neck. There is no redness, warmth, or swelling of the exposed joints. Gross motor strength is normal.    Neurologic: Awake, alert, oriented to name, place and time. Cranial nerves II-XII are grossly intact. Gait is normal.   Neuropsychiatric: Calm, cooperative. Normal affect.   " "  Prior Labs/Diagnostic Studies   All labs and imaging personally reviewed     EKG/ stress test - none    Component      Latest Ref Rng 3/2/2023  1:27 PM   INR HOME MONITORING      0.8 - 1.1  1.2 ! (E)   Creatinine (External)      0.60 - 1.20 mg/dL 0.73 (E)   Potassium (External)      3.5 - 5.1 mEq/L 3.9 (E)      Legend:  ! Abnormal  (E) External lab result          The patient's records and results personally reviewed by this provider.     Outside records reviewed from: Care Everywhere    LAB/DIAGNOSTIC STUDIES TODAY:  none    Assessment    Jovana Delgado is a 63 year old female seen as a PAC referral for risk assessment and optimization for anesthesia.    Plan/Recommendations  Pt will be optimized for the proposed procedure.  See below for details on the assessment, risk, and preoperative recommendations    NEUROLOGY  - No history of TIA, CVA or seizure    -Post Op delirium risk factors:  No risk identified    ENT  - No current airway concerns.  Will need to be reassessed day of surgery.  Mallampati: I  TM: > 3    CARDIAC  - No history of CAD, Hypertension, and Afib  - METS (Metabolic Equivalents)  Patient performs 4 or more METS exercise without symptoms            Total Score: 0      RCRI-Very low risk: Class 1 0.4% complication rate            Total Score: 0        PULMONARY    KRYSTLE Low Risk            Total Score: 1    KRYSTLE: Over 50 ys old      - Denies asthma or inhaler use  - Tobacco History    History   Smoking Status    Never   Smokeless Tobacco    Never       GI  - denies GERD  - following with Dr. Pedersen for liver disease.  - esophageal lesion - procedure planned as above.    PONV Medium Risk  Total Score: 2           1 AN PONV: Pt is Female    1 AN PONV: Patient is not a current smoker            ENDOCRINE    - BMI: Estimated body mass index is 25.22 kg/m  as calculated from the following:    Height as of this encounter: 1.664 m (5' 5.5\").    Weight as of this encounter: 69.8 kg (153 lb 14.4 " oz).  Overweight (BMI 25.0-29.9)  - No history of Diabetes Mellitus    HEME  VTE Low Risk 0.26%            Total Score: 1    VTE: Greater than 59 yrs old          PSYCH  - alcohol abuse in remission since 11/2020          Different anesthesia methods/types have been discussed with the patient, but they are aware that the final plan will be decided by the assigned anesthesia provider on the date of service.      The patient is optimized for their procedure. AVS with information on surgery time/arrival time, meds and NPO status given by nursing staff. No further diagnostic testing indicated.      On the day of service:     Prep time: 7 minutes  Visit time: 12 minutes  Documentation time: 7 minutes  ------------------------------------------  Total time: 26 minutes      HERNANDEZ Harding CNP  Preoperative Assessment Center  [facility]  [facility]  Phone: [phone]  Fax: [phone]

## 2023-08-10 ENCOUNTER — ANESTHESIA (OUTPATIENT)
Dept: ANESTHESIOLOGY | Facility: CLINIC | Age: 63
End: 2023-08-10
Payer: COMMERCIAL

## 2023-08-10 ENCOUNTER — HOSPITAL ENCOUNTER (OUTPATIENT)
Facility: CLINIC | Age: 63
Discharge: HOME | End: 2023-08-10
Attending: INTERNAL MEDICINE | Admitting: INTERNAL MEDICINE
Payer: COMMERCIAL

## 2023-08-10 VITALS
WEIGHT: 153 LBS | RESPIRATION RATE: 13 BRPM | DIASTOLIC BLOOD PRESSURE: 82 MMHG | HEART RATE: 68 BPM | SYSTOLIC BLOOD PRESSURE: 102 MMHG | BODY MASS INDEX: 25.07 KG/M2 | OXYGEN SATURATION: 93 %

## 2023-08-10 DIAGNOSIS — D13.0 SQUAMOUS CELL PAPILLOMA OF ESOPHAGUS: Primary | ICD-10-CM

## 2023-08-10 LAB — UPPER GI ENDOSCOPY: NORMAL

## 2023-08-10 PROCEDURE — 88305 TISSUE EXAM BY PATHOLOGIST: CPT | Mod: TC | Performed by: INTERNAL MEDICINE

## 2023-08-10 PROCEDURE — 43254 EGD ENDO MUCOSAL RESECTION: CPT | Performed by: INTERNAL MEDICINE

## 2023-08-10 PROCEDURE — 250N000011 HC RX IP 250 OP 636: Mod: JZ | Performed by: NURSE ANESTHETIST, CERTIFIED REGISTERED

## 2023-08-10 PROCEDURE — 370N000017 HC ANESTHESIA TECHNICAL FEE, PER MIN: Performed by: INTERNAL MEDICINE

## 2023-08-10 PROCEDURE — 250N000009 HC RX 250: Performed by: NURSE ANESTHETIST, CERTIFIED REGISTERED

## 2023-08-10 PROCEDURE — 88305 TISSUE EXAM BY PATHOLOGIST: CPT | Mod: 26 | Performed by: PATHOLOGY

## 2023-08-10 PROCEDURE — 258N000003 HC RX IP 258 OP 636: Performed by: NURSE ANESTHETIST, CERTIFIED REGISTERED

## 2023-08-10 PROCEDURE — 999N000010 HC STATISTIC ANES STAT CODE-CRNA PER MINUTE: Performed by: INTERNAL MEDICINE

## 2023-08-10 RX ORDER — PROPOFOL 10 MG/ML
INJECTION, EMULSION INTRAVENOUS CONTINUOUS PRN
Status: DISCONTINUED | OUTPATIENT
Start: 2023-08-10 | End: 2023-08-10

## 2023-08-10 RX ORDER — DEXMEDETOMIDINE HYDROCHLORIDE 4 UG/ML
INJECTION, SOLUTION INTRAVENOUS PRN
Status: DISCONTINUED | OUTPATIENT
Start: 2023-08-10 | End: 2023-08-10

## 2023-08-10 RX ORDER — LIDOCAINE HYDROCHLORIDE 20 MG/ML
INJECTION, SOLUTION INFILTRATION; PERINEURAL PRN
Status: DISCONTINUED | OUTPATIENT
Start: 2023-08-10 | End: 2023-08-10

## 2023-08-10 RX ORDER — PROPOFOL 10 MG/ML
INJECTION, EMULSION INTRAVENOUS PRN
Status: DISCONTINUED | OUTPATIENT
Start: 2023-08-10 | End: 2023-08-10

## 2023-08-10 RX ORDER — ONDANSETRON 2 MG/ML
INJECTION INTRAMUSCULAR; INTRAVENOUS PRN
Status: DISCONTINUED | OUTPATIENT
Start: 2023-08-10 | End: 2023-08-10

## 2023-08-10 RX ORDER — SODIUM CHLORIDE, SODIUM LACTATE, POTASSIUM CHLORIDE, CALCIUM CHLORIDE 600; 310; 30; 20 MG/100ML; MG/100ML; MG/100ML; MG/100ML
INJECTION, SOLUTION INTRAVENOUS CONTINUOUS PRN
Status: DISCONTINUED | OUTPATIENT
Start: 2023-08-10 | End: 2023-08-10

## 2023-08-10 RX ORDER — OMEPRAZOLE 40 MG/1
40 CAPSULE, DELAYED RELEASE ORAL 2 TIMES DAILY
Qty: 60 CAPSULE | Refills: 0 | Status: SHIPPED | OUTPATIENT
Start: 2023-08-10 | End: 2023-09-09

## 2023-08-10 RX ADMIN — PROPOFOL 20 MG: 10 INJECTION, EMULSION INTRAVENOUS at 14:22

## 2023-08-10 RX ADMIN — SODIUM CHLORIDE, POTASSIUM CHLORIDE, SODIUM LACTATE AND CALCIUM CHLORIDE: 600; 310; 30; 20 INJECTION, SOLUTION INTRAVENOUS at 14:09

## 2023-08-10 RX ADMIN — PROPOFOL 30 MG: 10 INJECTION, EMULSION INTRAVENOUS at 14:12

## 2023-08-10 RX ADMIN — LIDOCAINE HYDROCHLORIDE 50 MG: 20 INJECTION, SOLUTION INFILTRATION; PERINEURAL at 14:10

## 2023-08-10 RX ADMIN — PROPOFOL 150 MCG/KG/MIN: 10 INJECTION, EMULSION INTRAVENOUS at 14:10

## 2023-08-10 RX ADMIN — ONDANSETRON 4 MG: 2 INJECTION INTRAMUSCULAR; INTRAVENOUS at 14:10

## 2023-08-10 RX ADMIN — PROPOFOL 50 MG: 10 INJECTION, EMULSION INTRAVENOUS at 14:10

## 2023-08-10 RX ADMIN — DEXMEDETOMIDINE HYDROCHLORIDE 12 MCG: 200 INJECTION INTRAVENOUS at 14:10

## 2023-08-10 ASSESSMENT — ACTIVITIES OF DAILY LIVING (ADL): ADLS_ACUITY_SCORE: 35

## 2023-08-10 NOTE — ANESTHESIA CARE TRANSFER NOTE
Patient: Jovana Delgado    Procedure: Procedure(s):  ESOPHAGOGASTRODUODENOSCOPY, WITH MUCOSAL RESECTION       Diagnosis: Esophageal lesion [K22.9]  Diagnosis Additional Information: No value filed.    Anesthesia Type:   MAC     Note:    Oropharynx: oropharynx clear of all foreign objects and spontaneously breathing  Level of Consciousness: drowsy and awake  Oxygen Supplementation: room air    Independent Airway: airway patency satisfactory and stable  Dentition: dentition unchanged  Vital Signs Stable: post-procedure vital signs reviewed and stable  Report to RN Given: handoff report given  Patient transferred to: PACU  Comments: At end of procedure, spontaneous respirations, patient alert to voice, able to follow commands. Patient breathing room air at room air to PACU. SpO2, NiBP, and EKG monitors and alarms on and functioning, report on patient's clinical status given to PACU RN, RN questions answered.      Handoff Report: Identifed the Patient, Identified the Reponsible Provider, Reviewed the pertinent medical history, Discussed the surgical course, Reviewed Intra-OP anesthesia mangement and issues during anesthesia, Set expectations for post-procedure period and Allowed opportunity for questions and acknowledgement of understanding      Vitals:  Vitals Value Taken Time   /75 08/10/23 1434   Temp     Pulse 86 08/10/23 1436   Resp 16 08/10/23 1436   SpO2 91 % 08/10/23 1436   Vitals shown include unvalidated device data.    Electronically Signed By: HERNANDEZ Bowie CRNA  August 10, 2023  2:37 PM

## 2023-08-10 NOTE — ANESTHESIA PREPROCEDURE EVALUATION
Anesthesia Pre-Procedure Evaluation    Patient: Jovana Delgado   MRN: [id number] : 1960        Procedure : Procedure(s):  ESOPHAGOGASTRODUODENOSCOPY, WITH MUCOSAL RESECTION          Past Medical History:   Diagnosis Date    Alcoholic liver disease (H)     Cirrhosis of liver (H)     Portal hypertensive gastropathy (H)       Past Surgical History:   Procedure Laterality Date    CARPAL TUNNEL RELEASE RT/LT Bilateral     COLONOSCOPY      ESOPHAGOSCOPY, GASTROSCOPY, DUODENOSCOPY (EGD), COMBINED N/A 2023    Procedure: Esophagoscopy, gastroscopy, duodenoscopy (EGD), combined;  Surgeon: Aiyana Pedersen MD;  Location: [facility] OR    FOOT SURGERY Bilateral     great toe fusions    HYSTERECTOMY        No Known Allergies   Social History     Tobacco Use    Smoking status: Never    Smokeless tobacco: Never   Substance Use Topics    Alcohol use: Not Currently     Comment: Last drink 2020      Wt Readings from Last 1 Encounters:   23 69.8 kg (153 lb 14.4 oz)        Anesthesia Evaluation   Pt has had prior anesthetic.     No history of anesthetic complications       ROS/MED HX  ENT/Pulmonary:       Neurologic:  - neg neurologic ROS     Cardiovascular:  - neg cardiovascular ROS     METS/Exercise Tolerance: >4 METS    Hematologic:  - neg hematologic  ROS     Musculoskeletal:  - neg musculoskeletal ROS     GI/Hepatic: Comment: Alcoholic liver disease (H)  Cirrhosis of liver (H)    Portal hypertensive gastropathy (H)        (+)             liver disease,       Renal/Genitourinary:       Endo:  - neg endo ROS     Psychiatric/Substance Use:     (+)   alcohol abuse      Infectious Disease:  - neg infectious disease ROS     Malignancy:       Other:            Physical Exam    Airway  airway exam normal      Mallampati: II   TM distance: > 3 FB   Neck ROM: full   Mouth opening: > 3 cm    Respiratory Devices and Support         Dental       (+) Minor Abnormalities - some fillings, tiny chips      Cardiovascular    cardiovascular exam normal          Pulmonary   pulmonary exam normal                OUTSIDE LABS:  CBC:   Lab Results   Component Value Date    WBC 6.7 09/16/2022    WBC 6.5 03/11/2022    HGB 15.7 09/16/2022    HGB 16.0 (H) 03/11/2022    HCT 45.9 09/16/2022    HCT 48.5 (H) 03/11/2022     (L) 09/16/2022    PLT 95 (L) 03/11/2022     BMP:   Lab Results   Component Value Date     09/16/2022     03/11/2022    POTASSIUM 3.9 09/16/2022    POTASSIUM 3.7 03/11/2022    CHLORIDE 105 09/16/2022    CHLORIDE 105 03/11/2022    CO2 24 09/16/2022    CO2 28 03/11/2022    BUN 9.4 09/16/2022    BUN 6 (L) 03/11/2022    CR 0.66 09/16/2022    CR 0.66 03/11/2022     (H) 09/16/2022     (H) 03/11/2022     COAGS:   Lab Results   Component Value Date    INR 1.2 (A) 03/02/2023     POC: No results found for: BGM, HCG, HCGS  HEPATIC:   Lab Results   Component Value Date    ALBUMIN 4.4 09/16/2022    PROTTOTAL 7.3 09/16/2022    ALT 10 09/16/2022    AST 26 09/16/2022    ALKPHOS 124 (H) 09/16/2022    BILITOTAL 0.9 09/16/2022     OTHER:   Lab Results   Component Value Date    ARIK 9.5 09/16/2022       Anesthesia Plan    ASA Status:  2    NPO Status:  NPO Appropriate    Anesthesia Type: MAC (GETA backup.).     - Reason for MAC: straight local not clinically adequate   Induction: Intravenous, Propofol.           Consents    Anesthesia Plan(s) and associated risks, benefits, and realistic alternatives discussed. Questions answered and patient/representative(s) expressed understanding.     - Discussed:     - Discussed with:  Patient            Postoperative Care    Pain management: IV analgesics.   PONV prophylaxis: Ondansetron (or other 5HT-3)     Comments:                Pb Roque MD

## 2023-08-10 NOTE — ANESTHESIA POSTPROCEDURE EVALUATION
Patient: Jovana Delgado    Procedure: Procedure(s):  ESOPHAGOGASTRODUODENOSCOPY, WITH MUCOSAL RESECTION       Anesthesia Type:  MAC    Note:  Disposition: Outpatient   Postop Pain Control: Uneventful            Sign Out: Well controlled pain   PONV: No   Neuro/Psych: Uneventful            Sign Out: Acceptable/Baseline neuro status   Airway/Respiratory: Uneventful            Sign Out: Acceptable/Baseline resp. status   CV/Hemodynamics: Uneventful            Sign Out: Acceptable CV status; No obvious hypovolemia; No obvious fluid overload   Other NRE: NONE   DID A NON-ROUTINE EVENT OCCUR? No           Last vitals:  Vitals Value Taken Time   /82 08/10/23 1500   Temp     Pulse 68 08/10/23 1500   Resp 15 08/10/23 1454   SpO2 92 % 08/10/23 1453   Vitals shown include unvalidated device data.    Electronically Signed By: Pb Roque MD  August 10, 2023  4:13 PM

## 2023-08-11 LAB
PATH REPORT.COMMENTS IMP SPEC: NORMAL
PATH REPORT.FINAL DX SPEC: NORMAL
PATH REPORT.GROSS SPEC: NORMAL
PATH REPORT.MICROSCOPIC SPEC OTHER STN: NORMAL
PATH REPORT.RELEVANT HX SPEC: NORMAL
PHOTO IMAGE: NORMAL

## 2023-08-17 NOTE — RESULT ENCOUNTER NOTE
Pathology reviewed. Small focus of squamous papilloma remove with negative margins. Intestinal metaplasia is from the gastric cardia and is of no clinical concern. No follow up needed. MyChart message sent.    Jose Drummond MD  [facility]  Division of Gastroenterology and Hepatology  [facility] [address] [place], [place] [address]

## 2023-09-20 ENCOUNTER — OFFICE VISIT (OUTPATIENT)
Dept: GASTROENTEROLOGY | Facility: CLINIC | Age: 63
End: 2023-09-20
Attending: STUDENT IN AN ORGANIZED HEALTH CARE EDUCATION/TRAINING PROGRAM
Payer: COMMERCIAL

## 2023-09-20 ENCOUNTER — LAB (OUTPATIENT)
Dept: LAB | Facility: CLINIC | Age: 63
End: 2023-09-20
Payer: COMMERCIAL

## 2023-09-20 ENCOUNTER — ANCILLARY PROCEDURE (OUTPATIENT)
Dept: ULTRASOUND IMAGING | Facility: CLINIC | Age: 63
End: 2023-09-20
Attending: STUDENT IN AN ORGANIZED HEALTH CARE EDUCATION/TRAINING PROGRAM
Payer: COMMERCIAL

## 2023-09-20 VITALS
HEIGHT: 66 IN | DIASTOLIC BLOOD PRESSURE: 89 MMHG | WEIGHT: 156.1 LBS | HEART RATE: 83 BPM | SYSTOLIC BLOOD PRESSURE: 148 MMHG | BODY MASS INDEX: 25.09 KG/M2

## 2023-09-20 DIAGNOSIS — K70.31 ALCOHOLIC CIRRHOSIS OF LIVER WITH ASCITES (H): ICD-10-CM

## 2023-09-20 DIAGNOSIS — K70.30 ALCOHOLIC CIRRHOSIS OF LIVER WITHOUT ASCITES (H): ICD-10-CM

## 2023-09-20 DIAGNOSIS — K70.30 ALCOHOLIC CIRRHOSIS OF LIVER WITHOUT ASCITES (H): Primary | ICD-10-CM

## 2023-09-20 DIAGNOSIS — D13.0 SQUAMOUS CELL PAPILLOMA OF ESOPHAGUS: ICD-10-CM

## 2023-09-20 LAB
AFP SERPL-MCNC: 2.5 NG/ML
ALBUMIN SERPL BCG-MCNC: 4.3 G/DL (ref 3.5–5.2)
ALP SERPL-CCNC: 88 U/L (ref 35–104)
ALT SERPL W P-5'-P-CCNC: 9 U/L (ref 0–50)
ANION GAP SERPL CALCULATED.3IONS-SCNC: 9 MMOL/L (ref 7–15)
AST SERPL W P-5'-P-CCNC: 21 U/L (ref 0–45)
BILIRUB SERPL-MCNC: 0.5 MG/DL
BUN SERPL-MCNC: 10.3 MG/DL (ref 8–23)
CALCIUM SERPL-MCNC: 9.4 MG/DL (ref 8.8–10.2)
CHLORIDE SERPL-SCNC: 104 MMOL/L (ref 98–107)
CREAT SERPL-MCNC: 0.74 MG/DL (ref 0.51–0.95)
DEPRECATED HCO3 PLAS-SCNC: 27 MMOL/L (ref 22–29)
EGFRCR SERPLBLD CKD-EPI 2021: >90 ML/MIN/1.73M2
ERYTHROCYTE [DISTWIDTH] IN BLOOD BY AUTOMATED COUNT: 13.3 % (ref 10–15)
GLUCOSE SERPL-MCNC: 106 MG/DL (ref 70–99)
HCT VFR BLD AUTO: 45.4 % (ref 35–47)
HGB BLD-MCNC: 15.8 G/DL (ref 11.7–15.7)
INR PPP: 1.17 (ref 0.85–1.15)
MCH RBC QN AUTO: 30.4 PG (ref 26.5–33)
MCHC RBC AUTO-ENTMCNC: 34.8 G/DL (ref 31.5–36.5)
MCV RBC AUTO: 88 FL (ref 78–100)
PLATELET # BLD AUTO: 109 10E3/UL (ref 150–450)
POTASSIUM SERPL-SCNC: 4.6 MMOL/L (ref 3.4–5.3)
PROT SERPL-MCNC: 7 G/DL (ref 6.4–8.3)
RBC # BLD AUTO: 5.19 10E6/UL (ref 3.8–5.2)
SODIUM SERPL-SCNC: 140 MMOL/L (ref 136–145)
WBC # BLD AUTO: 6.8 10E3/UL (ref 4–11)

## 2023-09-20 PROCEDURE — 82105 ALPHA-FETOPROTEIN SERUM: CPT | Performed by: STUDENT IN AN ORGANIZED HEALTH CARE EDUCATION/TRAINING PROGRAM

## 2023-09-20 PROCEDURE — 76705 ECHO EXAM OF ABDOMEN: CPT | Mod: GC | Performed by: RADIOLOGY

## 2023-09-20 PROCEDURE — 99213 OFFICE O/P EST LOW 20 MIN: CPT | Performed by: STUDENT IN AN ORGANIZED HEALTH CARE EDUCATION/TRAINING PROGRAM

## 2023-09-20 PROCEDURE — 80053 COMPREHEN METABOLIC PANEL: CPT | Performed by: PATHOLOGY

## 2023-09-20 PROCEDURE — 85027 COMPLETE CBC AUTOMATED: CPT | Performed by: PATHOLOGY

## 2023-09-20 PROCEDURE — 99000 SPECIMEN HANDLING OFFICE-LAB: CPT | Performed by: PATHOLOGY

## 2023-09-20 PROCEDURE — 85610 PROTHROMBIN TIME: CPT | Performed by: PATHOLOGY

## 2023-09-20 PROCEDURE — 99214 OFFICE O/P EST MOD 30 MIN: CPT | Performed by: STUDENT IN AN ORGANIZED HEALTH CARE EDUCATION/TRAINING PROGRAM

## 2023-09-20 PROCEDURE — 36415 COLL VENOUS BLD VENIPUNCTURE: CPT | Performed by: PATHOLOGY

## 2023-09-20 ASSESSMENT — PAIN SCALES - GENERAL: PAINLEVEL: NO PAIN (0)

## 2023-09-20 NOTE — PROGRESS NOTES
[facility] Return Patient Visit    Date of Visit: 9/20/2023    Reason for referral: Alcohol related liver disease    Subjective: Ms. Delgado is a 63 year old woman with a history of alcohol use, alcohol related cirrhosis c/b ascites, who presents for evaluation of her liver disease.     Initial History:     Presented 11/2020 to the ED with abdominal swelling. Had a CT scan that showed cirrhosis with ascites. Underwent 3.5 L paracentesis. Last drink 2 weeks prior to this. Labs 11/20/2020 significant for PT 16.6 BR 5.1 AST 89 ALT 22 Albumin 3.2 Hgb 13. Unsure if she was yellow at that time.     Was drinking about a bottle of wine a day. Stopped 11/15 because she felt something was wrong. Primary told her that her liver was fatty in the past and mildly elevated LFTs. Told it was fatty liver, not related to drinking. Has not done counseling or treatment. Quit for a month in the past.     Has undergone several paracentesis in the meantime 12/29, 1/19, 2/2, 2/16, 3/2. Usually take 4-5 L, last only 2.1 L removed.    Never tried diuretics, denies issues with sodium or creatinine    Denies a history of GI bleeding, HE.     MRE 3/2022 showing advanced fibrosis/cirrhosis, no ascites    EGD 2023 with portal htn gastropathy, no varices. 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     Interval Events:  - Doing well, no clinical ascites  - Sober from alcohol  - Bought a home in [place] - will be there around the winter    ROS: 14 point ROS negative except for positives noted in HPI.    PMHx:  Alcohol related cirrhosis  History of HTN - improved  No history of CAD, heart disease, cancer    PSHx:  Breast Augmentation   Bunionectomy  Colonoscopy   Tubal ligation  Hysterectomy > 20 years ago    FamHx:  Father had a CVA/a. Fib  Mother with breast cancer  No family history of liver disease, liver cancer    SocHx:  Social History     Socioeconomic History    Marital status:      Spouse  "name: Not on file    Number of children: 2    Years of education: Not on file    Highest education level: Not on file   Occupational History    Occupation: unemployed   Tobacco Use    Smoking status: Never    Smokeless tobacco: Never   Substance and Sexual Activity    Alcohol use: Not Currently     Comment: Last drink 11/16/2020    Drug use: Never    Sexual activity: Not on file   Other Topics Concern    Not on file   Social History Narrative    Not on file     Social Determinants of Health     Financial Resource Strain: Not on file   Food Insecurity: Not on file   Transportation Needs: Not on file   Physical Activity: Not on file   Stress: Not on file   Social Connections: Not on file   Interpersonal Safety: Not on file   Housing Stability: Not on file   Never smoker  Denies alcohol use, last drink 11/15/2020, was drinking a bottle of wine day    Medications:  Current Outpatient Medications   Medication    cholecalciferol (VITAMIN D3) 25 mcg (1000 units) capsule    ibuprofen (ADVIL/MOTRIN) 200 MG tablet    tiZANidine (ZANAFLEX) 2 MG tablet    augmented betamethasone dipropionate (DIPROLENE-AF) 0.05 % external ointment     No current facility-administered medications for this visit.     Allergies:  No Known Allergies    Objective:  BP (!) 148/89   Pulse 83   Ht 1.664 m (5' 5.5\")   Wt 70.8 kg (156 lb 1.6 oz)   BMI 25.58 kg/m    Constitutional: pleasant woman in NAD  Eyes: non icteric  Respiratory: Normal respiratory excursion   MSK: normal range of motion of visualized extremities  Abd: Non distended  Skin: No jaundice  Psychiatric: normal mood and orientation    Labs:    MELD 3.0: 9 at 9/20/2023 10:48 AM  MELD-Na: 8 at 9/20/2023 10:48 AM  Calculated from:  Serum Creatinine: 0.74 mg/dL (Using min of 1 mg/dL) at 9/20/2023 10:48 AM  Serum Sodium: 140 mmol/L (Using max of 137 mmol/L) at 9/20/2023 10:48 AM  Total Bilirubin: 0.5 mg/dL (Using min of 1 mg/dL) at 9/20/2023 10:48 AM  Serum Albumin: 4.3 g/dL (Using max of " 3.5 g/dL) at 9/20/2023 10:48 AM  INR(ratio): 1.17 at 9/20/2023 10:48 AM  Age at listing (hypothetical): 63 years  Sex: Female at 9/20/2023 10:48 AM      11/2020  A1AT 214  Ceruloplasmin 18  Ferritin 724.5  Hepatitis C Ab negative  Hepatitis B Sag negative  Hepatitis B Core total Ab negative  Hepatitis A total ab negative    Patient tells me she had a 24 hour urine copper that was normal.     Imaging:    MRE 3/11/2022 - advanced fibrosis/cirrhosis    Endoscopy:    Colonoscopy 3/2021 - reports having a small polyp  EGD 3/2021 - per patient no varices.     Independently reviewed labs and imaging.     Assessment/Plan: Ms. Delgado is a 63 year old woman with a history of alcohol use, alcohol related cirrhosis c/b ascites, who presents for follow up of her liver disease.    She appears to have recompensated with prolonged period of sobriety. MRE 3/2022 showing fibrosis/cirrhosis.     Discussed the natural history of alcohol related liver disease, risk of progression with return to drinking, risk of HCC, and role of transplant.    - HCC Screening: RUQ US and labs every 6 months - will get her one in 6 month locally, she will tell us where to send the order to  - EV screening: EGD 6/2023 without varices, repeat 2-3 years  - Discussed complete abstinence from alcohol including NA beers    Orders Placed This Encounter   Procedures    US Abdomen Limited    CBC with platelets    Comprehensive metabolic panel    INR    AFP tumor marker       RTC 12 months with RUQ and labs, will get labs and RUQ US locally in 6 months    Aiyana Pedersen MD MS  Hepatology/Liver Transplant  [facility]    RUQ US and labs -

## 2023-09-20 NOTE — NURSING NOTE
"Chief Complaint   Patient presents with    RECHECK     Follow up with cirrhosis     BP (!) 148/89   Pulse 83   Ht 1.664 m (5' 5.5\")   Wt 70.8 kg (156 lb 1.6 oz)   BMI 25.58 kg/m    Alondra Cisneros CMA on 9/20/2023 at 12:29 PM    "

## 2023-09-20 NOTE — LETTER
9/20/2023         RE: Jovana Delgado  [address] [facility] [address]        Dear Colleague,    Thank you for referring your patient, Jovana Delgado, to the [facility] HEPATOLOGY CLIN[facility] [place]. Please see a copy of my visit note below.    [facility] [place] [facility] Return Patient Visit    Date of Visit: 9/20/2023    Reason for referral: Alcohol related liver disease    Subjective: Ms. Delgado is a 63 year old woman with a history of alcohol use, alcohol related cirrhosis c/b ascites, who presents for evaluation of her liver disease.     Initial History:     Presented 11/2020 to the ED with abdominal swelling. Had a CT scan that showed cirrhosis with ascites. Underwent 3.5 L paracentesis. Last drink 2 weeks prior to this. Labs 11/20/2020 significant for PT 16.6 BR 5.1 AST 89 ALT 22 Albumin 3.2 Hgb 13. Unsure if she was yellow at that time.     Was drinking about a bottle of wine a day. Stopped 11/15 because she felt something was wrong. Primary told her that her liver was fatty in the past and mildly elevated LFTs. Told it was fatty liver, not related to drinking. Has not done counseling or treatment. Quit for a month in the past.     Has undergone several paracentesis in the meantime 12/29, 1/19, 2/2, 2/16, 3/2. Usually take 4-5 L, last only 2.1 L removed.    Never tried diuretics, denies issues with sodium or creatinine    Denies a history of GI bleeding, HE.     MRE 3/2022 showing advanced fibrosis/cirrhosis, no ascites    EGD 2023 with portal htn gastropathy, no varices. 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     Interval Events:  - Doing well, no clinical ascites  - Sober from alcohol  - Bought a home in [place] - will be there around the winter    ROS: 14 point ROS negative except for positives noted in HPI.    PMHx:  Alcohol related cirrhosis  History of HTN - improved  No history of CAD, heart disease, cancer    PSHx:  Breast Augmentation  "  Bunionectomy  Colonoscopy   Tubal ligation  Hysterectomy > 20 years ago    FamHx:  Father had a CVA/a. Fib  Mother with breast cancer  No family history of liver disease, liver cancer    SocHx:  Social History     Socioeconomic History     Marital status:      Spouse name: Not on file     Number of children: 2     Years of education: Not on file     Highest education level: Not on file   Occupational History     Occupation: unemployed   Tobacco Use     Smoking status: Never     Smokeless tobacco: Never   Substance and Sexual Activity     Alcohol use: Not Currently     Comment: Last drink 11/16/2020     Drug use: Never     Sexual activity: Not on file   Other Topics Concern     Not on file   Social History Narrative     Not on file     Social Determinants of Health     Financial Resource Strain: Not on file   Food Insecurity: Not on file   Transportation Needs: Not on file   Physical Activity: Not on file   Stress: Not on file   Social Connections: Not on file   Interpersonal Safety: Not on file   Housing Stability: Not on file   Never smoker  Denies alcohol use, last drink 11/15/2020, was drinking a bottle of wine day    Medications:  Current Outpatient Medications   Medication     cholecalciferol (VITAMIN D3) 25 mcg (1000 units) capsule     ibuprofen (ADVIL/MOTRIN) 200 MG tablet     tiZANidine (ZANAFLEX) 2 MG tablet     augmented betamethasone dipropionate (DIPROLENE-AF) 0.05 % external ointment     No current facility-administered medications for this visit.     Allergies:  No Known Allergies    Objective:  BP (!) 148/89   Pulse 83   Ht 1.664 m (5' 5.5\")   Wt 70.8 kg (156 lb 1.6 oz)   BMI 25.58 kg/m    Constitutional: pleasant woman in NAD  Eyes: non icteric  Respiratory: Normal respiratory excursion   MSK: normal range of motion of visualized extremities  Abd: Non distended  Skin: No jaundice  Psychiatric: normal mood and orientation    Labs:    MELD 3.0: 9 at 9/20/2023 10:48 AM  MELD-Na: 8 at " 9/20/2023 10:48 AM  Calculated from:  Serum Creatinine: 0.74 mg/dL (Using min of 1 mg/dL) at 9/20/2023 10:48 AM  Serum Sodium: 140 mmol/L (Using max of 137 mmol/L) at 9/20/2023 10:48 AM  Total Bilirubin: 0.5 mg/dL (Using min of 1 mg/dL) at 9/20/2023 10:48 AM  Serum Albumin: 4.3 g/dL (Using max of 3.5 g/dL) at 9/20/2023 10:48 AM  INR(ratio): 1.17 at 9/20/2023 10:48 AM  Age at listing (hypothetical): 63 years  Sex: Female at 9/20/2023 10:48 AM      11/2020  A1AT 214  Ceruloplasmin 18  Ferritin 724.5  Hepatitis C Ab negative  Hepatitis B Sag negative  Hepatitis B Core total Ab negative  Hepatitis A total ab negative    Patient tells me she had a 24 hour urine copper that was normal.     Imaging:    MRE 3/11/2022 - advanced fibrosis/cirrhosis    Endoscopy:    Colonoscopy 3/2021 - reports having a small polyp  EGD 3/2021 - per patient no varices.     Independently reviewed labs and imaging.     Assessment/Plan: Ms. Delgado is a 63 year old woman with a history of alcohol use, alcohol related cirrhosis c/b ascites, who presents for follow up of her liver disease.    She appears to have recompensated with prolonged period of sobriety. MRE 3/2022 showing fibrosis/cirrhosis.     Discussed the natural history of alcohol related liver disease, risk of progression with return to drinking, risk of HCC, and role of transplant.    - HCC Screening: RUQ US and labs every 6 months - will get her one in 6 month locally, she will tell us where to send the order to  - EV screening: EGD 6/2023 without varices, repeat 2-3 years  - Discussed complete abstinence from alcohol including NA beers    Orders Placed This Encounter   Procedures     US Abdomen Limited     CBC with platelets     Comprehensive metabolic panel     INR     AFP tumor marker       RTC 12 months with RUQ and labs, will get labs and RUQ US locally in 6 months    Aiyana Pedersen MD MS  Hepatology/Liver Transplant  [facility]    RUQ US and labs -        Again,  thank you for allowing me to participate in the care of your patient.        Sincerely,        Aiyana Pedersen MD

## 2023-10-14 ENCOUNTER — HEALTH MAINTENANCE LETTER (OUTPATIENT)
Age: 63
End: 2023-10-14

## 2024-02-27 ENCOUNTER — MYC MEDICAL ADVICE (OUTPATIENT)
Dept: GASTROENTEROLOGY | Facility: CLINIC | Age: 64
End: 2024-02-27
Payer: COMMERCIAL

## 2024-02-27 DIAGNOSIS — K70.30 ALCOHOLIC CIRRHOSIS OF LIVER WITHOUT ASCITES (H): Primary | ICD-10-CM

## 2024-02-28 NOTE — TELEPHONE ENCOUNTER
Faxed orders for labs and imaging to [facility] in [place] at the request of the patient. Both are due March 2024.    Suri BO LPN  Hepatology Clinic    posterior cervical R/anterior cervical R/posterior cervical L/anterior cervical L/supraclavicular R/supraclavicular L

## 2024-03-02 ENCOUNTER — HEALTH MAINTENANCE LETTER (OUTPATIENT)
Age: 64
End: 2024-03-02

## 2024-03-05 ENCOUNTER — MYC MEDICAL ADVICE (OUTPATIENT)
Dept: GASTROENTEROLOGY | Facility: CLINIC | Age: 64
End: 2024-03-05
Payer: COMMERCIAL

## 2024-03-15 ENCOUNTER — TELEPHONE (OUTPATIENT)
Dept: GASTROENTEROLOGY | Facility: CLINIC | Age: 64
End: 2024-03-15
Payer: COMMERCIAL

## 2024-03-15 ENCOUNTER — TRANSFERRED RECORDS (OUTPATIENT)
Dept: HEALTH INFORMATION MANAGEMENT | Facility: CLINIC | Age: 64
End: 2024-03-15
Payer: COMMERCIAL

## 2024-03-15 LAB
ALT SERPL-CCNC: 16 U/L (ref 10–49)
AST SERPL-CCNC: 19 U/L (ref 0–34)
CHOLESTEROL (EXTERNAL): 229 MG/DL (ref 10–200)
CREATININE (EXTERNAL): 0.7 MG/DL (ref 0.5–1.1)
GFR ESTIMATED (EXTERNAL): >60 ML/MIN/1.73M2
GLUCOSE (EXTERNAL): 111 MG/DL (ref 74–106)
LDL CHOLESTEROL CALCULATED (EXTERNAL): 170 MG/DL (ref 0–160)
POTASSIUM (EXTERNAL): 4 MMOL/L (ref 3.5–5.5)
TRIGLYCERIDES (EXTERNAL): 124 MG/DL (ref 0–150)
TSH SERPL-ACNC: 2.75 MIU/ML (ref 0.35–4.67)

## 2024-03-15 NOTE — TELEPHONE ENCOUNTER
Faxed imaging order to [facility] as requested, called and confirmed receipt.    Chante Vaughn, RN Care Coordinator  [facility]  Hepatology Clinic/Specialty Program

## 2024-03-15 NOTE — TELEPHONE ENCOUNTER
[facility] Call Center    Phone Message    May a detailed message be left on voicemail: yes     Reason for Call: Other: [facility] called to inform that diagnostic code was found and electronic signature was received, all is good. Thank you.      Action Taken: Other: hep    Travel Screening: Not Applicable

## 2024-03-15 NOTE — TELEPHONE ENCOUNTER
[facility] Call Center    Phone Message    May a detailed message be left on voicemail: yes     Reason for Call: Other: [facility] calling back requesting orders w/ signature and diagnosis code on it. Please reach out to [facility] to discuss.     Action Taken: Message routed to:  [facility] HEP    Travel Screening: Not Applicable

## 2024-03-15 NOTE — TELEPHONE ENCOUNTER
[facility] Call Center    Phone Message    May a detailed message be left on voicemail: yes     Reason for Call: Other: [facility] at [facility] called as patient is there to get an US Abdomen Limited.  However, they do not have an order.  Please fax order to them, ASAP, to: [phone].  NOTE:  Patient is there now.     Action Taken: Message routed to:  [facility]atology Adult [facility]    Travel Screening: Not Applicable

## 2024-03-19 ENCOUNTER — TRANSFERRED RECORDS (OUTPATIENT)
Dept: HEALTH INFORMATION MANAGEMENT | Facility: CLINIC | Age: 64
End: 2024-03-19
Payer: COMMERCIAL

## 2024-08-26 DIAGNOSIS — K70.30 ALCOHOLIC CIRRHOSIS OF LIVER WITHOUT ASCITES (H): Primary | ICD-10-CM

## 2024-09-17 ENCOUNTER — OFFICE VISIT (OUTPATIENT)
Dept: GASTROENTEROLOGY | Facility: CLINIC | Age: 64
End: 2024-09-17
Attending: STUDENT IN AN ORGANIZED HEALTH CARE EDUCATION/TRAINING PROGRAM
Payer: COMMERCIAL

## 2024-09-17 ENCOUNTER — ANCILLARY PROCEDURE (OUTPATIENT)
Dept: ULTRASOUND IMAGING | Facility: CLINIC | Age: 64
End: 2024-09-17
Attending: STUDENT IN AN ORGANIZED HEALTH CARE EDUCATION/TRAINING PROGRAM
Payer: COMMERCIAL

## 2024-09-17 ENCOUNTER — LAB (OUTPATIENT)
Dept: LAB | Facility: CLINIC | Age: 64
End: 2024-09-17
Payer: COMMERCIAL

## 2024-09-17 VITALS
DIASTOLIC BLOOD PRESSURE: 88 MMHG | SYSTOLIC BLOOD PRESSURE: 135 MMHG | TEMPERATURE: 98.7 F | BODY MASS INDEX: 25.27 KG/M2 | HEART RATE: 82 BPM | OXYGEN SATURATION: 97 % | WEIGHT: 154.2 LBS

## 2024-09-17 DIAGNOSIS — R19.7 DIARRHEA, UNSPECIFIED TYPE: Primary | ICD-10-CM

## 2024-09-17 DIAGNOSIS — K70.30 ALCOHOLIC CIRRHOSIS OF LIVER WITHOUT ASCITES (H): ICD-10-CM

## 2024-09-17 DIAGNOSIS — R19.7 DIARRHEA, UNSPECIFIED TYPE: ICD-10-CM

## 2024-09-17 LAB
AFP SERPL-MCNC: 2.2 NG/ML
ALBUMIN SERPL BCG-MCNC: 4.4 G/DL (ref 3.5–5.2)
ALP SERPL-CCNC: 79 U/L (ref 40–150)
ALT SERPL W P-5'-P-CCNC: 16 U/L (ref 0–50)
ANION GAP SERPL CALCULATED.3IONS-SCNC: 9 MMOL/L (ref 7–15)
AST SERPL W P-5'-P-CCNC: 22 U/L (ref 0–45)
BILIRUB DIRECT SERPL-MCNC: 0.21 MG/DL (ref 0–0.3)
BILIRUB SERPL-MCNC: 0.7 MG/DL
BUN SERPL-MCNC: 9.7 MG/DL (ref 8–23)
CALCIUM SERPL-MCNC: 9.5 MG/DL (ref 8.8–10.4)
CHLORIDE SERPL-SCNC: 104 MMOL/L (ref 98–107)
CREAT SERPL-MCNC: 0.78 MG/DL (ref 0.51–0.95)
CRP SERPL-MCNC: <3 MG/L
EGFRCR SERPLBLD CKD-EPI 2021: 85 ML/MIN/1.73M2
ERYTHROCYTE [DISTWIDTH] IN BLOOD BY AUTOMATED COUNT: 13.2 % (ref 10–15)
GLUCOSE SERPL-MCNC: 105 MG/DL (ref 70–99)
HCO3 SERPL-SCNC: 28 MMOL/L (ref 22–29)
HCT VFR BLD AUTO: 45.7 % (ref 35–47)
HGB BLD-MCNC: 15.6 G/DL (ref 11.7–15.7)
INR PPP: 1.1 (ref 0.85–1.15)
MCH RBC QN AUTO: 29.9 PG (ref 26.5–33)
MCHC RBC AUTO-ENTMCNC: 34.1 G/DL (ref 31.5–36.5)
MCV RBC AUTO: 88 FL (ref 78–100)
PLATELET # BLD AUTO: 117 10E3/UL (ref 150–450)
POTASSIUM SERPL-SCNC: 4.7 MMOL/L (ref 3.4–5.3)
PROT SERPL-MCNC: 7.1 G/DL (ref 6.4–8.3)
RBC # BLD AUTO: 5.22 10E6/UL (ref 3.8–5.2)
SODIUM SERPL-SCNC: 141 MMOL/L (ref 135–145)
WBC # BLD AUTO: 5.6 10E3/UL (ref 4–11)

## 2024-09-17 PROCEDURE — 85027 COMPLETE CBC AUTOMATED: CPT | Performed by: PATHOLOGY

## 2024-09-17 PROCEDURE — 85610 PROTHROMBIN TIME: CPT | Performed by: PATHOLOGY

## 2024-09-17 PROCEDURE — 99214 OFFICE O/P EST MOD 30 MIN: CPT | Performed by: STUDENT IN AN ORGANIZED HEALTH CARE EDUCATION/TRAINING PROGRAM

## 2024-09-17 PROCEDURE — 82105 ALPHA-FETOPROTEIN SERUM: CPT | Performed by: STUDENT IN AN ORGANIZED HEALTH CARE EDUCATION/TRAINING PROGRAM

## 2024-09-17 PROCEDURE — 76705 ECHO EXAM OF ABDOMEN: CPT | Mod: GC | Performed by: RADIOLOGY

## 2024-09-17 PROCEDURE — 99000 SPECIMEN HANDLING OFFICE-LAB: CPT | Performed by: PATHOLOGY

## 2024-09-17 PROCEDURE — 80053 COMPREHEN METABOLIC PANEL: CPT | Performed by: PATHOLOGY

## 2024-09-17 PROCEDURE — 86140 C-REACTIVE PROTEIN: CPT | Performed by: PATHOLOGY

## 2024-09-17 PROCEDURE — 82248 BILIRUBIN DIRECT: CPT | Performed by: PATHOLOGY

## 2024-09-17 PROCEDURE — 36415 COLL VENOUS BLD VENIPUNCTURE: CPT | Performed by: PATHOLOGY

## 2024-09-17 PROCEDURE — 99213 OFFICE O/P EST LOW 20 MIN: CPT | Performed by: STUDENT IN AN ORGANIZED HEALTH CARE EDUCATION/TRAINING PROGRAM

## 2024-09-17 ASSESSMENT — PAIN SCALES - GENERAL: PAINLEVEL: NO PAIN (0)

## 2024-09-17 NOTE — LETTER
9/17/2024      Jovana Delgado  [address] [facility] [address]      Dear Colleague,    Thank you for referring your patient, Jovana Delgado, to the [facility] HEPATOLOGY [facility] [place]. Please see a copy of my visit note below.    [facility] [place] [facility] Return Patient Visit    Date of Visit: September 17, 2024    Reason for referral: Alcohol related liver disease    Subjective: Ms. Delgado is a 64 year old woman with a history of alcohol use, alcohol related cirrhosis c/b ascites, who presents for evaluation of her liver disease.     Initial History:     Presented 11/2020 to the ED with abdominal swelling. Had a CT scan that showed cirrhosis with ascites. Underwent 3.5 L paracentesis. Last drink 2 weeks prior to this. Labs 11/20/2020 significant for PT 16.6 BR 5.1 AST 89 ALT 22 Albumin 3.2 Hgb 13. Unsure if she was yellow at that time.     Was drinking about a bottle of wine a day. Stopped 11/15 because she felt something was wrong. Primary told her that her liver was fatty in the past and mildly elevated LFTs. Told it was fatty liver, not related to drinking. Has not done counseling or treatment. Quit for a month in the past.     Has undergone several paracentesis in the meantime 12/29, 1/19, 2/2, 2/16, 3/2. Usually take 4-5 L, last only 2.1 L removed.    Never tried diuretics, denies issues with sodium or creatinine    Denies a history of GI bleeding, HE.     MRE 3/2022 showing advanced fibrosis/cirrhosis, no ascites    EGD 2023 with portal htn gastropathy, no varices. 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     Interval Events:  - Doing well, no clinical ascites  - Sober from alcohol  - Bought a home in [place] -will be there November through April.  - Has chronic diarrhea.  Colonoscopy in 2021 unrevealing but no random biopsies were taken.  Will have up to 10 bowel movements a day.  Took Imodium 2 weeks ago and now is having 1 bowel movement a day. No anemia.  Has  not been tested for celiac disease in the past, duodenum normal endoscopically on previous upper endoscopies.  Weight has not changed    ROS: 14 point ROS negative except for positives noted in HPI.    PMHx:  Alcohol related cirrhosis  History of HTN - improved  No history of CAD, heart disease, cancer    PSHx:  Breast Augmentation   Bunionectomy  Colonoscopy   Tubal ligation  Hysterectomy > 20 years ago    FamHx:  Father had a CVA/a. Fib  Mother with breast cancer  No family history of liver disease, liver cancer    SocHx:  Social History     Socioeconomic History     Marital status:      Spouse name: Not on file     Number of children: 2     Years of education: Not on file     Highest education level: Not on file   Occupational History     Occupation: unemployed   Tobacco Use     Smoking status: Never     Smokeless tobacco: Never   Substance and Sexual Activity     Alcohol use: Not Currently     Comment: Last drink 11/16/2020     Drug use: Never     Sexual activity: Not on file   Other Topics Concern     Not on file   Social History Narrative     Not on file     Social Determinants of Health     Financial Resource Strain: Not on file   Food Insecurity: Not on file   Transportation Needs: Not on file   Physical Activity: Not on file   Stress: Not on file   Social Connections: Not on file   Interpersonal Safety: Not on file   Housing Stability: Not on file   Never smoker  Denies alcohol use, last drink 11/15/2020, was drinking a bottle of wine day    Medications:  Current Outpatient Medications   Medication Sig Dispense Refill     cholecalciferol (VITAMIN D3) 25 mcg (1000 units) capsule Take 1 capsule by mouth daily       tiZANidine (ZANAFLEX) 2 MG tablet Take 1-2 tablets by mouth nightly as needed       augmented betamethasone dipropionate (DIPROLENE-AF) 0.05 % external ointment Apply topically as needed (Patient not taking: Reported on 9/20/2023)       ibuprofen (ADVIL/MOTRIN) 200 MG tablet Take 200 mg by  mouth every 4 hours as needed for mild pain (rarely takes)       No current facility-administered medications for this visit.     Allergies:  No Known Allergies    Objective:  /88   Pulse 82   Temp 98.7  F (37.1  C) (Oral)   Wt 69.9 kg (154 lb 3.2 oz)   SpO2 97%   BMI 25.27 kg/m    Constitutional: pleasant woman in NAD  Eyes: non icteric  Respiratory: Normal respiratory excursion   MSK: normal range of motion of visualized extremities  Abd: Non distended  Skin: No jaundice  Psychiatric: normal mood and orientation    Labs:    MELD 3.0: 8 at 9/17/2024 11:00 AM  MELD-Na: 7 at 9/17/2024 11:00 AM  Calculated from:  Serum Creatinine: 0.78 mg/dL (Using min of 1 mg/dL) at 9/17/2024 11:00 AM  Serum Sodium: 141 mmol/L (Using max of 137 mmol/L) at 9/17/2024 11:00 AM  Total Bilirubin: 0.7 mg/dL (Using min of 1 mg/dL) at 9/17/2024 11:00 AM  Serum Albumin: 4.4 g/dL (Using max of 3.5 g/dL) at 9/17/2024 11:00 AM  INR(ratio): 1.10 at 9/17/2024 11:00 AM  Age at listing (hypothetical): 64 years  Sex: Female at 9/17/2024 11:00 AM      11/2020  A1AT 214  Ceruloplasmin 18  Ferritin 724.5  Hepatitis C Ab negative  Hepatitis B Sag negative  Hepatitis B Core total Ab negative  Hepatitis A total ab negative    Patient tells me she had a 24 hour urine copper that was normal.     Imaging:    MRE 3/11/2022 - advanced fibrosis/cirrhosis    Endoscopy:    Colonoscopy 3/2021 - reports having a small polyp  EGD 3/2021 - per patient no varices.     Independently reviewed labs and imaging.     Assessment/Plan: Ms. Delgado is a 64 year old woman with a history of alcohol use, alcohol related cirrhosis c/b ascites, who presents for follow up of her liver disease.    She appears to have recompensated with prolonged period of sobriety. MRE 3/2022 showing fibrosis/cirrhosis.  Ultrasound today showing cirrhosis with recanalized paraumbilical vein    Discussed the natural history of alcohol related liver disease, risk of progression with return  to drinking, risk of HCC, and role of transplant.    Discussed her diarrhea.  If this is persistent would consider repeat colonoscopy with biopsies.  She does not have any alarm symptoms.  Will add on a CRP to today's lab.  Will get celiac serologies with next of the labs although her previous EGD showed a normal duodenum.  Will also send a fecal elastase.  Okay to take Imodium    - HCC Screening: RUQ US and labs every 6 months - will get her one in 6 month locally in [place]  - EV screening: EGD 6/2023 without varices, repeat 2-3 years  - Discussed complete abstinence from alcohol including NA beers    Orders Placed This Encounter   Procedures     US Abdomen Limited     Tissue transglutaminase gama IgA and IgG     CRP inflammation     Elastase Fecal     Hepatic function panel     Basic metabolic panel     CBC with platelets     INR       RTC 12 months with RUQ and labs, will get labs and RUQ US locally in 6 months    Aiyana Pedersen MD MS  Hepatology/Liver Transplant  [facility]    RUQ US and labs     [facility]  - [facility]  [facility]  Dr. Collazo (PCP in [place])      Again, thank you for allowing me to participate in the care of your patient.        Sincerely,        Aiyana Pedersen MD

## 2024-09-17 NOTE — PROGRESS NOTES
[facility] Liver Clinic Return Patient Visit    Date of Visit: September 17, 2024    Reason for referral: Alcohol related liver disease    Subjective: Ms. Delgado is a 64 year old woman with a history of alcohol use, alcohol related cirrhosis c/b ascites, who presents for evaluation of her liver disease.     Initial History:     Presented 11/2020 to the ED with abdominal swelling. Had a CT scan that showed cirrhosis with ascites. Underwent 3.5 L paracentesis. Last drink 2 weeks prior to this. Labs 11/20/2020 significant for PT 16.6 BR 5.1 AST 89 ALT 22 Albumin 3.2 Hgb 13. Unsure if she was yellow at that time.     Was drinking about a bottle of wine a day. Stopped 11/15 because she felt something was wrong. Primary told her that her liver was fatty in the past and mildly elevated LFTs. Told it was fatty liver, not related to drinking. Has not done counseling or treatment. Quit for a month in the past.     Has undergone several paracentesis in the meantime 12/29, 1/19, 2/2, 2/16, 3/2. Usually take 4-5 L, last only 2.1 L removed.    Never tried diuretics, denies issues with sodium or creatinine    Denies a history of GI bleeding, HE.     MRE 3/2022 showing advanced fibrosis/cirrhosis, no ascites    EGD 2023 with portal htn gastropathy, no varices. 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     Interval Events:  - Doing well, no clinical ascites  - Sober from alcohol  - Bought a home in [place] -will be there November through April.  - Has chronic diarrhea.  Colonoscopy in 2021 unrevealing but no random biopsies were taken.  Will have up to 10 bowel movements a day.  Took Imodium 2 weeks ago and now is having 1 bowel movement a day. No anemia.  Has not been tested for celiac disease in the past, duodenum normal endoscopically on previous upper endoscopies.  Weight has not changed    ROS: 14 point ROS negative except for positives noted in HPI.    PMHx:  Alcohol related cirrhosis  History  of HTN - improved  No history of CAD, heart disease, cancer    PSHx:  Breast Augmentation   Bunionectomy  Colonoscopy   Tubal ligation  Hysterectomy > 20 years ago    FamHx:  Father had a CVA/a. Fib  Mother with breast cancer  No family history of liver disease, liver cancer    SocHx:  Social History     Socioeconomic History    Marital status:      Spouse name: Not on file    Number of children: 2    Years of education: Not on file    Highest education level: Not on file   Occupational History    Occupation: unemployed   Tobacco Use    Smoking status: Never    Smokeless tobacco: Never   Substance and Sexual Activity    Alcohol use: Not Currently     Comment: Last drink 11/16/2020    Drug use: Never    Sexual activity: Not on file   Other Topics Concern    Not on file   Social History Narrative    Not on file     Social Determinants of Health     Financial Resource Strain: Not on file   Food Insecurity: Not on file   Transportation Needs: Not on file   Physical Activity: Not on file   Stress: Not on file   Social Connections: Not on file   Interpersonal Safety: Not on file   Housing Stability: Not on file   Never smoker  Denies alcohol use, last drink 11/15/2020, was drinking a bottle of wine day    Medications:  Current Outpatient Medications   Medication Sig Dispense Refill    cholecalciferol (VITAMIN D3) 25 mcg (1000 units) capsule Take 1 capsule by mouth daily      tiZANidine (ZANAFLEX) 2 MG tablet Take 1-2 tablets by mouth nightly as needed      augmented betamethasone dipropionate (DIPROLENE-AF) 0.05 % external ointment Apply topically as needed (Patient not taking: Reported on 9/20/2023)      ibuprofen (ADVIL/MOTRIN) 200 MG tablet Take 200 mg by mouth every 4 hours as needed for mild pain (rarely takes)       No current facility-administered medications for this visit.     Allergies:  No Known Allergies    Objective:  /88   Pulse 82   Temp 98.7  F (37.1  C) (Oral)   Wt 69.9 kg (154 lb 3.2 oz)    SpO2 97%   BMI 25.27 kg/m    Constitutional: pleasant woman in NAD  Eyes: non icteric  Respiratory: Normal respiratory excursion   MSK: normal range of motion of visualized extremities  Abd: Non distended  Skin: No jaundice  Psychiatric: normal mood and orientation    Labs:    MELD 3.0: 8 at 9/17/2024 11:00 AM  MELD-Na: 7 at 9/17/2024 11:00 AM  Calculated from:  Serum Creatinine: 0.78 mg/dL (Using min of 1 mg/dL) at 9/17/2024 11:00 AM  Serum Sodium: 141 mmol/L (Using max of 137 mmol/L) at 9/17/2024 11:00 AM  Total Bilirubin: 0.7 mg/dL (Using min of 1 mg/dL) at 9/17/2024 11:00 AM  Serum Albumin: 4.4 g/dL (Using max of 3.5 g/dL) at 9/17/2024 11:00 AM  INR(ratio): 1.10 at 9/17/2024 11:00 AM  Age at listing (hypothetical): 64 years  Sex: Female at 9/17/2024 11:00 AM      11/2020  A1AT 214  Ceruloplasmin 18  Ferritin 724.5  Hepatitis C Ab negative  Hepatitis B Sag negative  Hepatitis B Core total Ab negative  Hepatitis A total ab negative    Patient tells me she had a 24 hour urine copper that was normal.     Imaging:    MRE 3/11/2022 - advanced fibrosis/cirrhosis    Endoscopy:    Colonoscopy 3/2021 - reports having a small polyp  EGD 3/2021 - per patient no varices.     Independently reviewed labs and imaging.     Assessment/Plan: Ms. Delgado is a 64 year old woman with a history of alcohol use, alcohol related cirrhosis c/b ascites, who presents for follow up of her liver disease.    She appears to have recompensated with prolonged period of sobriety. MRE 3/2022 showing fibrosis/cirrhosis.  Ultrasound today showing cirrhosis with recanalized paraumbilical vein    Discussed the natural history of alcohol related liver disease, risk of progression with return to drinking, risk of HCC, and role of transplant.    Discussed her diarrhea.  If this is persistent would consider repeat colonoscopy with biopsies.  She does not have any alarm symptoms.  Will add on a CRP to today's lab.  Will get celiac serologies with next of  the labs although her previous EGD showed a normal duodenum.  Will also send a fecal elastase.  Okay to take Imodium    - HCC Screening: RUQ US and labs every 6 months - will get her one in 6 month locally in [place]  - EV screening: EGD 6/2023 without varices, repeat 2-3 years  - Discussed complete abstinence from alcohol including NA beers    Orders Placed This Encounter   Procedures    US Abdomen Limited    Tissue transglutaminase gama IgA and IgG    CRP inflammation    Elastase Fecal    Hepatic function panel    Basic metabolic panel    CBC with platelets    INR       RTC 12 months with RUQ and labs, will get labs and RUQ US locally in 6 months    Aiyana Pedersen MD MS  Hepatology/Liver Transplant  [facility]    RUQ US and labs     [facility]  - [facility]  [facility]  Dr. Collazo (PCP in [place])

## 2024-09-17 NOTE — NURSING NOTE
Chief Complaint   Patient presents with    RECHECK     1 yr f/u       /88   Pulse 82   Temp 98.7  F (37.1  C) (Oral)   Wt 69.9 kg (154 lb 3.2 oz)   SpO2 97%   BMI 25.27 kg/m      Ramon Allen on 9/17/2024 at 11:12 AM

## 2025-03-03 ENCOUNTER — MYC MEDICAL ADVICE (OUTPATIENT)
Dept: GASTROENTEROLOGY | Facility: CLINIC | Age: 65
End: 2025-03-03
Payer: COMMERCIAL

## 2025-03-03 DIAGNOSIS — K70.30 ALCOHOLIC CIRRHOSIS OF LIVER WITHOUT ASCITES (H): Primary | ICD-10-CM

## 2025-03-10 ENCOUNTER — TRANSFERRED RECORDS (OUTPATIENT)
Dept: HEALTH INFORMATION MANAGEMENT | Facility: CLINIC | Age: 65
End: 2025-03-10
Payer: COMMERCIAL

## 2025-03-14 ENCOUNTER — TRANSFERRED RECORDS (OUTPATIENT)
Dept: HEALTH INFORMATION MANAGEMENT | Facility: CLINIC | Age: 65
End: 2025-03-14
Payer: COMMERCIAL

## 2025-03-15 ENCOUNTER — HEALTH MAINTENANCE LETTER (OUTPATIENT)
Age: 65
End: 2025-03-15

## (undated) DEVICE — SYR 30ML SLIP TIP W/O NDL 302833

## (undated) DEVICE — SUCTION CATH AIRLIFE TRI-FLO W/CONTROL PORT 14FR  T60C

## (undated) DEVICE — SUCTION MANIFOLD NEPTUNE 2 SYS 1 PORT 702-025-000

## (undated) DEVICE — GOWN IMPERVIOUS 2XL BLUE

## (undated) DEVICE — ENDO FORCEP BX CAPTURA PRO SPIKE G50696

## (undated) DEVICE — KIT ENDO TURNOVER/PROCEDURE CARRY-ON 101822

## (undated) DEVICE — SOL WATER IRRIG 500ML BOTTLE 2F7113

## (undated) DEVICE — ENDO BITE BLOCK ADULT OMNI-BLOC

## (undated) DEVICE — GLOVE EXAM NITRILE LG PF LATEX FREE 5064

## (undated) DEVICE — SPECIMEN CONTAINER 3OZ W/FORMALIN 59901

## (undated) DEVICE — TUBING SUCTION MEDI-VAC 1/4"X20' N620A